# Patient Record
Sex: MALE | Race: WHITE | Employment: FULL TIME | URBAN - METROPOLITAN AREA
[De-identification: names, ages, dates, MRNs, and addresses within clinical notes are randomized per-mention and may not be internally consistent; named-entity substitution may affect disease eponyms.]

---

## 2022-08-02 ENCOUNTER — APPOINTMENT (OUTPATIENT)
Dept: CT IMAGING | Age: 52
End: 2022-08-02
Attending: EMERGENCY MEDICINE
Payer: COMMERCIAL

## 2022-08-02 ENCOUNTER — HOSPITAL ENCOUNTER (OUTPATIENT)
Age: 52
Setting detail: OBSERVATION
Discharge: HOME | End: 2022-08-04
Attending: EMERGENCY MEDICINE | Admitting: FAMILY MEDICINE
Payer: COMMERCIAL

## 2022-08-02 DIAGNOSIS — I16.1 HYPERTENSIVE EMERGENCY: Primary | ICD-10-CM

## 2022-08-02 PROBLEM — R53.1 ACUTE RIGHT-SIDED WEAKNESS: Status: ACTIVE | Noted: 2022-08-02

## 2022-08-02 PROBLEM — R20.0 RIGHT SIDED NUMBNESS: Status: ACTIVE | Noted: 2022-08-02

## 2022-08-02 PROBLEM — G45.9 TIA (TRANSIENT ISCHEMIC ATTACK): Status: ACTIVE | Noted: 2022-08-02

## 2022-08-02 PROBLEM — I16.0 HYPERTENSIVE URGENCY: Status: ACTIVE | Noted: 2022-08-02

## 2022-08-02 LAB
ALBUMIN SERPL-MCNC: 3.7 G/DL (ref 3.5–5)
ALBUMIN/GLOB SERPL: 0.9 {RATIO} (ref 1.1–2.2)
ALP SERPL-CCNC: 65 U/L (ref 45–117)
ALT SERPL-CCNC: 30 U/L (ref 12–78)
ANION GAP SERPL CALC-SCNC: 7 MMOL/L (ref 5–15)
AST SERPL-CCNC: 15 U/L (ref 15–37)
ATRIAL RATE: 71 BPM
BASOPHILS # BLD: 0 K/UL (ref 0–0.1)
BASOPHILS NFR BLD: 0 % (ref 0–1)
BILIRUB SERPL-MCNC: 0.6 MG/DL (ref 0.2–1)
BUN SERPL-MCNC: 15 MG/DL (ref 6–20)
BUN/CREAT SERPL: 15 (ref 12–20)
CALCIUM SERPL-MCNC: 9.2 MG/DL (ref 8.5–10.1)
CALCULATED P AXIS, ECG09: 53 DEGREES
CALCULATED R AXIS, ECG10: 33 DEGREES
CALCULATED T AXIS, ECG11: 22 DEGREES
CHLORIDE SERPL-SCNC: 106 MMOL/L (ref 97–108)
CO2 SERPL-SCNC: 26 MMOL/L (ref 21–32)
CREAT SERPL-MCNC: 0.98 MG/DL (ref 0.7–1.3)
DIAGNOSIS, 93000: NORMAL
DIFFERENTIAL METHOD BLD: NORMAL
EOSINOPHIL # BLD: 0.2 K/UL (ref 0–0.4)
EOSINOPHIL NFR BLD: 2 % (ref 0–7)
ERYTHROCYTE [DISTWIDTH] IN BLOOD BY AUTOMATED COUNT: 12.9 % (ref 11.5–14.5)
GLOBULIN SER CALC-MCNC: 4 G/DL (ref 2–4)
GLUCOSE SERPL-MCNC: 94 MG/DL (ref 65–100)
HCT VFR BLD AUTO: 41.6 % (ref 36.6–50.3)
HGB BLD-MCNC: 14.4 G/DL (ref 12.1–17)
IMM GRANULOCYTES # BLD AUTO: 0 K/UL (ref 0–0.04)
IMM GRANULOCYTES NFR BLD AUTO: 0 % (ref 0–0.5)
INR PPP: 1 (ref 0.9–1.1)
LYMPHOCYTES # BLD: 1.9 K/UL (ref 0.8–3.5)
LYMPHOCYTES NFR BLD: 23 % (ref 12–49)
MCH RBC QN AUTO: 30.5 PG (ref 26–34)
MCHC RBC AUTO-ENTMCNC: 34.6 G/DL (ref 30–36.5)
MCV RBC AUTO: 88.1 FL (ref 80–99)
MONOCYTES # BLD: 0.6 K/UL (ref 0–1)
MONOCYTES NFR BLD: 7 % (ref 5–13)
NEUTS SEG # BLD: 5.8 K/UL (ref 1.8–8)
NEUTS SEG NFR BLD: 68 % (ref 32–75)
NRBC # BLD: 0 K/UL (ref 0–0.01)
NRBC BLD-RTO: 0 PER 100 WBC
P-R INTERVAL, ECG05: 148 MS
PLATELET # BLD AUTO: 196 K/UL (ref 150–400)
PMV BLD AUTO: 10.5 FL (ref 8.9–12.9)
POTASSIUM SERPL-SCNC: 3.5 MMOL/L (ref 3.5–5.1)
PROT SERPL-MCNC: 7.7 G/DL (ref 6.4–8.2)
PROTHROMBIN TIME: 10.6 SEC (ref 9–11.1)
Q-T INTERVAL, ECG07: 408 MS
QRS DURATION, ECG06: 90 MS
QTC CALCULATION (BEZET), ECG08: 443 MS
RBC # BLD AUTO: 4.72 M/UL (ref 4.1–5.7)
SODIUM SERPL-SCNC: 139 MMOL/L (ref 136–145)
VENTRICULAR RATE, ECG03: 71 BPM
WBC # BLD AUTO: 8.5 K/UL (ref 4.1–11.1)

## 2022-08-02 PROCEDURE — 70496 CT ANGIOGRAPHY HEAD: CPT

## 2022-08-02 PROCEDURE — 85025 COMPLETE CBC W/AUTO DIFF WBC: CPT

## 2022-08-02 PROCEDURE — 74011000636 HC RX REV CODE- 636: Performed by: RADIOLOGY

## 2022-08-02 PROCEDURE — 99285 EMERGENCY DEPT VISIT HI MDM: CPT

## 2022-08-02 PROCEDURE — 85610 PROTHROMBIN TIME: CPT

## 2022-08-02 PROCEDURE — 94762 N-INVAS EAR/PLS OXIMTRY CONT: CPT

## 2022-08-02 PROCEDURE — 0042T CT CODE NEURO PERF W CBF: CPT

## 2022-08-02 PROCEDURE — 74011250637 HC RX REV CODE- 250/637: Performed by: EMERGENCY MEDICINE

## 2022-08-02 PROCEDURE — APPNB30 APP NON BILLABLE TIME 0-30 MINS: Performed by: NURSE PRACTITIONER

## 2022-08-02 PROCEDURE — 70450 CT HEAD/BRAIN W/O DYE: CPT

## 2022-08-02 PROCEDURE — 96374 THER/PROPH/DIAG INJ IV PUSH: CPT

## 2022-08-02 PROCEDURE — 36415 COLL VENOUS BLD VENIPUNCTURE: CPT

## 2022-08-02 PROCEDURE — 80053 COMPREHEN METABOLIC PANEL: CPT

## 2022-08-02 PROCEDURE — 74011000250 HC RX REV CODE- 250: Performed by: EMERGENCY MEDICINE

## 2022-08-02 PROCEDURE — G0378 HOSPITAL OBSERVATION PER HR: HCPCS

## 2022-08-02 PROCEDURE — 93005 ELECTROCARDIOGRAM TRACING: CPT

## 2022-08-02 RX ORDER — ACETAMINOPHEN 650 MG/1
650 SUPPOSITORY RECTAL
Status: DISCONTINUED | OUTPATIENT
Start: 2022-08-02 | End: 2022-08-04 | Stop reason: HOSPADM

## 2022-08-02 RX ORDER — LABETALOL HYDROCHLORIDE 5 MG/ML
5 INJECTION, SOLUTION INTRAVENOUS
Status: DISCONTINUED | OUTPATIENT
Start: 2022-08-02 | End: 2022-08-04 | Stop reason: HOSPADM

## 2022-08-02 RX ORDER — ACETAMINOPHEN 325 MG/1
650 TABLET ORAL
Status: DISCONTINUED | OUTPATIENT
Start: 2022-08-02 | End: 2022-08-04 | Stop reason: HOSPADM

## 2022-08-02 RX ORDER — ASPIRIN 325 MG
325 TABLET ORAL ONCE
Status: COMPLETED | OUTPATIENT
Start: 2022-08-02 | End: 2022-08-02

## 2022-08-02 RX ORDER — CLOPIDOGREL BISULFATE 75 MG/1
300 TABLET ORAL ONCE
Status: COMPLETED | OUTPATIENT
Start: 2022-08-02 | End: 2022-08-03

## 2022-08-02 RX ORDER — LABETALOL HYDROCHLORIDE 5 MG/ML
10 INJECTION, SOLUTION INTRAVENOUS ONCE
Status: COMPLETED | OUTPATIENT
Start: 2022-08-02 | End: 2022-08-02

## 2022-08-02 RX ORDER — ASPIRIN 325 MG
325 TABLET ORAL DAILY
Status: DISCONTINUED | OUTPATIENT
Start: 2022-08-03 | End: 2022-08-02 | Stop reason: SDUPTHER

## 2022-08-02 RX ADMIN — IOPAMIDOL 50 ML: 755 INJECTION, SOLUTION INTRAVENOUS at 15:21

## 2022-08-02 RX ADMIN — IOPAMIDOL 100 ML: 755 INJECTION, SOLUTION INTRAVENOUS at 15:22

## 2022-08-02 RX ADMIN — ASPIRIN 325 MG ORAL TABLET 325 MG: 325 PILL ORAL at 20:51

## 2022-08-02 RX ADMIN — LABETALOL HYDROCHLORIDE 10 MG: 5 INJECTION INTRAVENOUS at 15:38

## 2022-08-02 NOTE — ED TRIAGE NOTES
Pt woke this am at [phone] with right arm numbness and felt like he was leaning to the right, denies headache, denies dizziness, denies fever, denies vision of speech difficulty, no blood thinners, no head trauma, lkw 0230, denies leg or facial numbness

## 2022-08-02 NOTE — PROGRESS NOTES
Neurocritical Care Code Stroke Documentation      Symptoms:   Leaning to the right, difficulty controlling right arm   Patient was noted to be hypertensive with /110   Last Known Well: 2:30 am this morning    Medical hx: Hypertension    Anticoagulation: None    VAN:   Negative   NIHSS:   1a-LOC:0    1b-Month/Age:0    1c-Open/Close Hand:0    2-Best Gaze:0    3-Visual Fields:0    4-Facial Palsy:0    5a-Left Arm:0    5b-Right Arm:0    6a-Left Le    6b-Right Le    7-Limb Ataxia:0    8-Sensory:0    9-Best Language:0    10-Dysarthria:0    11-Extinction/Inattention:0  TOTAL SCORE:0   Imaging:   CT: No acute abnormality. CTA: pending (final results below)    CTP: pending (final results below)   Plan:   TNK Candidate: NO    Mechanical thrombectomy Candidate: NO     Discussed with: Dr. Kris Palencia. Arrival time: 1509  Time spent: 25 minutes. Elba Ruth NP  Neurocritical Care Nurse Practitioner    Addendum 4924: CTA HEAD  Left vertebral artery is dominant. There is mild stenosis of the left V4  segment. There is severe multifocal stenosis of the right V4 segment. Fetal  origin of the left posterior cerebral artery. Mild stenosis of the proximal  right P2 segment. There is moderate stenosis of the left P2 segment. . Moderate  to severe stenosis of the anterior right M2 segment. . . There is no aneurysm. .     CT PERFUSION:     There are no regional areas of elevated Tmax, decreased cerebral blood flow or  blood volume. rCBF < 30% = 0 cc. Tmax > 6 seconds = 0 cc. IMPRESSION     1. Intracranial atherosclerotic disease with multifocal stenoses as described. 2.  No large vessel occlusion is identified. Discussed wih Dr. Mary Topete of CTA results. Would recommend medical management with DAPT. Would recommend loading with aspirin and Plavix ( Discussed with ED Physician Dr. Kris Palencia). Recommend checking aspirin and P2Y12 test in AM after loading dose. Neurology consult for stroke evaluation.

## 2022-08-02 NOTE — ED PROVIDER NOTES
48-year-old male with a history of hypertension presents with a chief complaint of feeling off balance and right arm heaviness. Patient reports her symptoms started around 2:30 AM.  He is not currently on any blood pressure medications. He denies similar symptoms in the past.  He denies chest pain, shortness of breath, abdominal pain, GI or urinary symptoms. No past medical history on file. No past surgical history on file. No family history on file. Social History     Socioeconomic History    Marital status: Not on file     Spouse name: Not on file    Number of children: Not on file    Years of education: Not on file    Highest education level: Not on file   Occupational History    Not on file   Tobacco Use    Smoking status: Not on file    Smokeless tobacco: Not on file   Substance and Sexual Activity    Alcohol use: Not on file    Drug use: Not on file    Sexual activity: Not on file   Other Topics Concern    Not on file   Social History Narrative    Not on file     Social Determinants of Health     Financial Resource Strain: Not on file   Food Insecurity: Not on file   Transportation Needs: Not on file   Physical Activity: Not on file   Stress: Not on file   Social Connections: Not on file   Intimate Partner Violence: Not on file   Housing Stability: Not on file         ALLERGIES: Patient has no allergy information on record. Review of Systems   Constitutional:  Negative for fever. HENT:  Negative for rhinorrhea. Respiratory:  Negative for cough. Cardiovascular:  Negative for chest pain. Gastrointestinal:  Negative for abdominal pain. Genitourinary:  Negative for dysuria. Musculoskeletal:  Negative for back pain. Skin:  Negative for wound. Neurological:  Positive for numbness. Psychiatric/Behavioral:  Negative for confusion. Vitals:    08/02/22 1506   BP: (!) 225/110   Pulse: 73   Resp: 16   SpO2: 98%            Physical Exam  Vitals and nursing note reviewed. Constitutional:       General: He is not in acute distress. Appearance: Normal appearance. He is not ill-appearing, toxic-appearing or diaphoretic. HENT:      Head: Normocephalic. Eyes:      Extraocular Movements: Extraocular movements intact. Cardiovascular:      Rate and Rhythm: Normal rate. Pulses: Normal pulses. Heart sounds: Normal heart sounds. Pulmonary:      Effort: Pulmonary effort is normal. No respiratory distress. Breath sounds: Normal breath sounds. Abdominal:      General: There is no distension. Musculoskeletal:         General: Normal range of motion. Cervical back: Normal range of motion. Skin:     General: Skin is dry. Capillary Refill: Capillary refill takes less than 2 seconds. Neurological:      Mental Status: He is alert and oriented to person, place, and time. Comments: Subtle right upper extremity dysmetria   Psychiatric:         Mood and Affect: Mood normal.        MDM  Number of Diagnoses or Management Options  Hypertensive emergency  Diagnosis management comments:     Patient presents with concern for stroke. Stroke alert activated. Patient taken to CT where imaging is unremarkable. Labs unremarkable. Patient not a tPA candidate. Symptoms rapidly improving. Will admit to hospital medicine for stroke work-up.       Perfect Serve Consult for Admission  4:22 PM    ED Room Number: ER25/25  Patient Name and age:  Kari Pastor 47 y.o.  male  Working Diagnosis: Hypertensive emergency  (primary encounter diagnosis)    COVID-19 Suspicion:  no  Sepsis present:  no  Reassessment needed: no  Code Status:  Full Code  Readmission: no  Isolation Requirements:  no  Recommended Level of Care:  step down  Department:[facility] Adult ED - 21   Other:  no tpa, awaiting neuro recs regarding blood pressure control    Total critical care time spent exclusive of procedures: 37 minutes           Amount and/or Complexity of Data Reviewed  Clinical lab tests: ordered and reviewed  Tests in the radiology section of CPT®: ordered and reviewed      ED Course as of 08/02/22 2209 Tue Aug 02, 2022   1607 EKG shows sinus rhythm at a rate of 71, normal intervals, normal axis, no ischemic changes.  [RD]      ED Course User Index  [RD] Tracey Bacon MD       Procedures

## 2022-08-03 ENCOUNTER — APPOINTMENT (OUTPATIENT)
Dept: MRI IMAGING | Age: 52
End: 2022-08-03
Attending: PSYCHIATRY & NEUROLOGY
Payer: COMMERCIAL

## 2022-08-03 ENCOUNTER — APPOINTMENT (OUTPATIENT)
Dept: NON INVASIVE DIAGNOSTICS | Age: 52
End: 2022-08-03
Attending: FAMILY MEDICINE
Payer: COMMERCIAL

## 2022-08-03 LAB
ASPIRIN TEST, ASPIRN: 377 ARU
CHOLEST SERPL-MCNC: 189 MG/DL
ECHO AV PEAK GRADIENT: 6 MMHG
ECHO AV PEAK VELOCITY: 1.2 M/S
ECHO AV VELOCITY RATIO: 0.75
ECHO LV E' LATERAL VELOCITY: 9 CM/S
ECHO LV E' SEPTAL VELOCITY: 9 CM/S
ECHO LV FRACTIONAL SHORTENING: 53 % (ref 28–44)
ECHO LV INTERNAL DIMENSION DIASTOLE INDEX: 1.99 CM/M2
ECHO LV INTERNAL DIMENSION DIASTOLIC: 5.1 CM (ref 4.2–5.9)
ECHO LV INTERNAL DIMENSION SYSTOLIC INDEX: 0.94 CM/M2
ECHO LV INTERNAL DIMENSION SYSTOLIC: 2.4 CM
ECHO LV IVSD: 0.9 CM (ref 0.6–1)
ECHO LV MASS 2D: 175.6 G (ref 88–224)
ECHO LV MASS INDEX 2D: 68.6 G/M2 (ref 49–115)
ECHO LV POSTERIOR WALL DIASTOLIC: 1 CM (ref 0.6–1)
ECHO LV RELATIVE WALL THICKNESS RATIO: 0.39
ECHO LVOT PEAK GRADIENT: 3 MMHG
ECHO LVOT PEAK VELOCITY: 0.9 M/S
ECHO MV A VELOCITY: 0.63 M/S
ECHO MV E VELOCITY: 0.67 M/S
ECHO MV E/A RATIO: 1.06
ECHO MV E/E' LATERAL: 7.44
ECHO MV E/E' RATIO (AVERAGED): 7.44
ECHO MV E/E' SEPTAL: 7.44
EST. AVERAGE GLUCOSE BLD GHB EST-MCNC: 105 MG/DL
HBA1C MFR BLD: 5.3 % (ref 4–5.6)
HDLC SERPL-MCNC: 77 MG/DL
HDLC SERPL: 2.5 {RATIO} (ref 0–5)
LDLC SERPL CALC-MCNC: 97.4 MG/DL (ref 0–100)
P2Y12 PLT RESPONSE,PPPR: 190 PRU (ref 194–418)
TRIGL SERPL-MCNC: 73 MG/DL (ref ?–150)
VLDLC SERPL CALC-MCNC: 14.6 MG/DL

## 2022-08-03 PROCEDURE — 99204 OFFICE O/P NEW MOD 45 MIN: CPT | Performed by: PSYCHIATRY & NEUROLOGY

## 2022-08-03 PROCEDURE — 77030040361 HC SLV COMPR DVT MDII -B

## 2022-08-03 PROCEDURE — 85576 BLOOD PLATELET AGGREGATION: CPT

## 2022-08-03 PROCEDURE — 92610 EVALUATE SWALLOWING FUNCTION: CPT

## 2022-08-03 PROCEDURE — 80061 LIPID PANEL: CPT

## 2022-08-03 PROCEDURE — 74011250637 HC RX REV CODE- 250/637: Performed by: EMERGENCY MEDICINE

## 2022-08-03 PROCEDURE — 97165 OT EVAL LOW COMPLEX 30 MIN: CPT

## 2022-08-03 PROCEDURE — G0378 HOSPITAL OBSERVATION PER HR: HCPCS

## 2022-08-03 PROCEDURE — 93306 TTE W/DOPPLER COMPLETE: CPT

## 2022-08-03 PROCEDURE — 36415 COLL VENOUS BLD VENIPUNCTURE: CPT

## 2022-08-03 PROCEDURE — 74011250637 HC RX REV CODE- 250/637: Performed by: PSYCHIATRY & NEUROLOGY

## 2022-08-03 PROCEDURE — 97161 PT EVAL LOW COMPLEX 20 MIN: CPT

## 2022-08-03 PROCEDURE — 70551 MRI BRAIN STEM W/O DYE: CPT

## 2022-08-03 PROCEDURE — 74011250637 HC RX REV CODE- 250/637: Performed by: INTERNAL MEDICINE

## 2022-08-03 PROCEDURE — 83036 HEMOGLOBIN GLYCOSYLATED A1C: CPT

## 2022-08-03 PROCEDURE — 97530 THERAPEUTIC ACTIVITIES: CPT

## 2022-08-03 RX ORDER — ASPIRIN 81 MG/1
81 TABLET ORAL DAILY
Status: DISCONTINUED | OUTPATIENT
Start: 2022-08-03 | End: 2022-08-04 | Stop reason: HOSPADM

## 2022-08-03 RX ORDER — AMLODIPINE BESYLATE 5 MG/1
2.5 TABLET ORAL DAILY
Status: DISCONTINUED | OUTPATIENT
Start: 2022-08-03 | End: 2022-08-04

## 2022-08-03 RX ORDER — ATORVASTATIN CALCIUM 40 MG/1
40 TABLET, FILM COATED ORAL
Status: DISCONTINUED | OUTPATIENT
Start: 2022-08-03 | End: 2022-08-04 | Stop reason: HOSPADM

## 2022-08-03 RX ORDER — CLOPIDOGREL BISULFATE 75 MG/1
75 TABLET ORAL DAILY
Status: DISCONTINUED | OUTPATIENT
Start: 2022-08-03 | End: 2022-08-04 | Stop reason: HOSPADM

## 2022-08-03 RX ADMIN — ASPIRIN 81 MG: 81 TABLET, COATED ORAL at 10:10

## 2022-08-03 RX ADMIN — AMLODIPINE BESYLATE 2.5 MG: 5 TABLET ORAL at 16:18

## 2022-08-03 RX ADMIN — CLOPIDOGREL BISULFATE 300 MG: 75 TABLET ORAL at 01:09

## 2022-08-03 RX ADMIN — ATORVASTATIN CALCIUM 40 MG: 40 TABLET, FILM COATED ORAL at 23:12

## 2022-08-03 RX ADMIN — CLOPIDOGREL BISULFATE 75 MG: 75 TABLET ORAL at 10:10

## 2022-08-03 NOTE — PROGRESS NOTES
Orders received, chart reviewed and patient evaluated by physical therapy. Recommend:  No skilled physical therapy/ follow up rehabilitation needs identified at this time. Of note, BP elevated in standing to 217/109mmHg - no c/o HA. Full evaluation to follow.      Matthew Bartlett, PT, DPT

## 2022-08-03 NOTE — PROGRESS NOTES
Music Therapy Assessment  [address]    John Herrera [id number]     1970  47 y.o.  male    Patient Telephone Number: [phone] (home)   Restorationist Affiliation: Spiritism   Language: English   Patient Active Problem List    Diagnosis Date Noted    TIA (transient ischemic attack) 08/02/2022    Acute right-sided weakness 08/02/2022    Hypertensive urgency 08/02/2022    Right sided numbness 08/02/2022        Date: 8/3/2022            Total Time (in minutes): 30          [facility] 6S NEURO-SCI TELE    Mental Status:   [x] Alert [  ] Zehra Damme [  ]  Confused  [  ] Minimally responsive  [  ] Sleeping    Communication Status: [  ] Impaired Speech [  ] Nonverbal -N/A    Physical Status:   [  ] Oxygen in use  [  ] Hard of Hearing [  ] Vision Impaired  [  ] Ambulatory  [  ] Ambulatory with assistance [  ] Non-ambulatory -N/A    Music Preferences, Background: Pt named Carolyn Perez as a favorite musician. Pt's spouse pointed out that the pt also enjoys songs from [employer]. Pt's tone of voice sounded surprised by this at first, and then pt agreed, saying he likes songs that tell a story. Clinical Problem addressed: Support relaxation and stress reduction. Goal(s) met in session:  Physical/Pain management (Scale of 1-10):    Pre-session rating: Pt denied pain. Post-session rating: Pt didn't report on pain.   [x] Increased relaxation   [x] Affected breathing patterns  [x] Decreased muscle tension   [  ] Decreased agitation  [  ] Affected heart rate    [  ] Increased alertness     Emotional/Psychological:  [x] Increased self-expression   [  ] Decreased aggressive behavior   [x] Decreased feelings of stress  [x] Discussed healthy coping skills     [  ] Improved mood    [  ] Decreased withdrawn behavior     Social:  [  ] Decreased feelings of isolation/loneliness [  ] Positive social interaction   [x] Provided support and/or comfort for family/friends    Spiritual:  [  ] Spiritual support    [  ] Expressed peace  [  ] Expressed sergio    [  ] Discussed beliefs    Techniques Utilized (Check all that apply):   [  ] Procedural support MT [x] Music for relaxation [  ] Patient preferred music  [  ] Ольга analysis  [  ] Song choice  [  ] Music for validation  [  ] Entrainment  [  ] Movement to music [x] Guided visualization  [  ] Tiffani Fees  [  ] Patient instrument playing [  ] Farheen Segoviaot writing  [  ] Pedro Place along   [  ] Kreg Spire  [  ] Sensory stimulation  [x] Active Listening  [  ] Music for spiritual support [  ] Making of CDs as gifts    Session Observations:  Referral from Father Susana Gibbs. Patient (pt) was alert lying in bed and his spouse Gregor Amaya was at bedside. This music therapist (MT) asked the pt how he was feeling, about his music preferences and then briefly explained role. Pt's spouse had been wanting to run an errand and exited to do so, knowing the pt would be in a good place of receiving music therapy. MT sat at bedside and began playing the acoustic guitar. MT played a chord progression softly and at a steady, moderately slow tempo. MT provided a music-accompanied meditation, starting by talking the pt through mindfully relaxing different muscle groups with the use of intentional slow, deep breaths, visualizing his breath as a cleansing and strengthening force. MT then invited the pt to imagine being in a safe, comfortable, peaceful place where all is well. MT guided the pt in thinking through what his five senses would be experiencing in this place to help him feel more present in that calming environment. All the while MT provided the guitar accompaniment to create a space for pt to focus his mind on the meditation. MT slowly and gradually brought the pt back into the room as the music experience concluded.  MT observed the pt to actively participate and increase relaxation, as evidenced by (AEB) his eyes remaining closed, his breathing slowing and deepening, making adjustments to his body's position in bed to relax his muscles. Pt said he wished his blood pressure was being monitored during this because he felt certain his blood pressure would have dropped. He reported feeling more calm and relaxed. He asked MT how he could replicate a similar experience at home. MT made recommendations of practical ways pt could do this. Pt's spouse re-entered as the MT and pt were saying good bye. The pt expressed gratitude for the session, as did his spouse.     NENA EstradaBC (Music Therapist-Board Certified)  Spiritual Care Department  Referral-based service

## 2022-08-03 NOTE — PROGRESS NOTES
SPEECH PATHOLOGY BEDSIDE SWALLOW EVALUATION/DISCHARGE  Patient: Sandie Rivers (54 y.o. male)  Date: 8/3/2022  Primary Diagnosis: Hypertensive urgency [I16.0]  TIA (transient ischemic attack) [G45.9]  Acute right-sided weakness [R53.1]  Right sided numbness [R20.0]       Precautions:        ASSESSMENT :  Based on the objective data described below, the patient presents with functional oropharyngeal swallow with no difficulties or s/s of aspiration appreciated at bedside with any consistencies. Patient denies any speech or swallowing concerns. Given this and CT negative for acute infarct, recommend regular diet/thin liquids with general aspiration precautions. Suspect pt is at baseline swallow function and therefore, skilled acute therapy provided by a speech-language pathologist is not indicated at this time. SLP will sign off. Please reconsult with any changes or if SLP can be of any further assistance. PLAN :  Recommendations:  -- regular diet/ thin liquids  -- general aspiration precautions including completely upright for all PO   -- SLP will sign off     Discharge Recommendations: None     SUBJECTIVE:   Patient stated I was even talking on work meetings yesterday while it was happening and my speech was fine. OBJECTIVE:   No past medical history on file. No past surgical history on file. Prior Level of Function/Home Situation:      Diet prior to admission: regular/thin  Current Diet:  regular/thin   Cognitive and Communication Status:  Neurologic State: Alert  Orientation Level: Oriented X4  Cognition: Follows commands  Perception: Appears intact  Perseveration: No perseveration noted  Safety/Judgement: Awareness of environment  Oral Assessment:  Oral Assessment  Labial: No impairment  Dentition: Intact; Natural  Oral Hygiene: moist oral mucoosa free of secretions  Lingual: No impairment  Velum: No impairment  Mandible: No impairment  P.O.  Trials:  Patient Position: upright in bed  Vocal quality prior to P.O.: No impairment  Consistency Presented: Thin liquid  How Presented: Self-fed/presented     Bolus Acceptance: No impairment  Bolus Formation/Control: No impairment     Propulsion: No impairment  Oral Residue: None  Initiation of Swallow: No impairment  Laryngeal Elevation: Functional  Aspiration Signs/Symptoms: None  Pharyngeal Phase Characteristics: No impairment, issues, or problems              Oral Phase Severity: No impairment  Pharyngeal Phase Severity : No impairment  NOMS:   The NOMS functional outcome measure was used to quantify this patient's level of swallowing impairment. Based on the NOMS, the patient was determined to be at level 7 for swallow function     NOMS Swallowing Levels:  Level 1 (CN): NPO  Level 2 (CM): NPO but takes consistency in therapy  Level 3 (CL): Takes less than 50% of nutrition p.o. and continues with nonoral feedings; and/or safe with mod cues; and/or max diet restriction  Level 4 (CK): Safe swallow but needs mod cues; and/or mod diet restriction; and/or still requires some nonoral feeding/supplements  Level 5 (CJ): Safe swallow with min diet restriction; and/or needs min cues  Level 6 (CI): Independent with p.o.; rare cues; usually self cues; may need to avoid some foods or needs extra time  Level 7 ([address] [place] [address]): Independent for all p.o.  GREGORIO. (2003). National Outcomes Measurement System (NOMS): Adult Speech-Language Pathology User's Guide. Pain:  Pain Scale 1: Numeric (0 - 10)  Pain Intensity 1: 0     After treatment:   Patient left in no apparent distress in bed, Call bell within reach, and Nursing notified    COMMUNICATION/EDUCATION:     The patient's plan of care including recommendations, planned interventions, and recommended diet changes were discussed with: Registered nurse.      Thank you for this referral.  Arnold Magallon M.S. [address] Pathologist     Time Calculation: 8 mins

## 2022-08-03 NOTE — PROGRESS NOTES
Bedside and Verbal shift change report given to MAGGIE García (oncoming nurse) by Ruiz Jones (offgoing nurse). Report included the following information SBAR, Kardex, Intake/Output, MAR, Recent Results, Cardiac Rhythm NSR, and Dual Neuro Assessment.

## 2022-08-03 NOTE — ED NOTES
TRANSFER - OUT REPORT:    Verbal report given to [address], RN(name) on Monae Fernandes  being transferred to NSTU(unit) for routine progression of care       Report consisted of patients Situation, Background, Assessment and   Recommendations(SBAR). Information from the following report(s) SBAR was reviewed with the receiving nurse. Lines:   Peripheral IV 08/02/22 Right Antecubital (Active)   Site Assessment Clean, dry, & intact 08/02/22 1519   Phlebitis Assessment 0 08/02/22 1519   Infiltration Assessment 0 08/02/22 1519   Dressing Status Clean, dry, & intact 08/02/22 1519   Hub Color/Line Status Flushed 08/02/22 1519        Opportunity for questions and clarification was provided.       Patient transported with:   [employer]

## 2022-08-03 NOTE — PROGRESS NOTES
Transition of Care Plan  RUR- Observation  DISPOSITION: Home with spouse  F/U with PCP/Specialist    Transport: Spouse    Reason for Admission:  stroke work up                     RUR Score:          observation            Plan for utilizing home health:  no hx of hh, no home health needs - therapy has cleared patient         PCP: First and Last name:  Zain Flood     Name of Practice:    Are you a current patient: Yes/No: Yes   Approximate date of last visit: Patient has his first appointment with Eden Tineo scheduled for 8/4/22   Can you participate in a virtual visit with your PCP:                     Current Advanced Directive/Advance Care Plan: Full Code      Healthcare Decision Maker: [address] is spouse  Click here to complete [address] including selection of the Healthcare Decision Maker Relationship (ie \"Primary\")                             Transition of Care Plan:                      CM met with patient and spouse at bedside to introduce self and role. Living situation: lives with spouse in 2 story home, 4 steps to enter, works full time for [employer]  ADLs: independent  DME: none  Previous IPR/SNF: none  Previous home health: none  Demographics: confirmed, [address] insured  Ph[facility] [place] [address]  Main point of contact: [facility] [phone]    CM to follow patient progress and assist as recommended with NAOMIE plan. Care Management Interventions  PCP Verified by CM: Yes  Palliative Care Criteria Met (RRAT>21 & CHF Dx)?: No  Mode of Transport at Discharge:  Other (see comment) (spouse)  Transition of Care Consult (CM Consult): Discharge Planning  MyChart Signup: No  Discharge Durable Medical Equipment: No  Health Maintenance Reviewed: Yes  Physical Therapy Consult: Yes  Occupational Therapy Consult: Yes  Speech Therapy Consult: Yes  Support Systems: Spouse/Significant Other  Confirm Follow Up Transport: Family  The Plan for Transition of Care is Related to the Following Treatment Goals : home  The Patient and/or Patient Representative was Provided with a Choice of Provider and Agrees with the Discharge Plan?: No  Name of the Patient Representative Who was Provided with a Choice of Provider and Agrees with the Discharge Plan: patient, spouse  Freedom of Choice List was Provided with Basic Dialogue that Supports the Patient's Individualized Plan of Care/Goals, Treatment Preferences and Shares the Quality Data Associated with the Providers?: No  [place] Resource Information Provided?: No  Discharge Location  Patient Expects to be Discharged to[de-identified] Home    The program assesses the family and/or caregiver's readiness, willingness, and ability to provide or support and self-management activities for the patient as needed. Florecita Rodriguez M.S.JUAN ALBERTO.

## 2022-08-03 NOTE — PROGRESS NOTES
PHYSICAL THERAPY EVALUATION WITH DISCHARGE  Patient: Joann Esrtada (2 y.o. male)  Date: 8/3/2022  Primary Diagnosis: Hypertensive urgency [I16.0]  TIA (transient ischemic attack) [G45.9]  Acute right-sided weakness [R53.1]  Right sided numbness [R20.0]       Precautions:          ASSESSMENT  Based on the objective data described below, the patient presents at/near his baseline functional mobility level following admission for HTN emergency and c/o R UE weakness and impaired coordination (now resolved). CT negative, MRI pending at time of eval. At baseline, he lived at home with family and was fully indep and active. Neuro exam today appears non focal. Strength, coordination, sensation, vision, and speech in tact. He was able to complete bed mobility, transfers, and take a few steps to chair an an overall Indep/SUP level. Further mobility progression deferred 2* consistently elevating BP with positional changes, up to 217/109mmHg in stance, however pt reports that he has been ambulating to bathroom with steady gait and no mobility concerns. Remained up in chair at end of session, NAD. Provided education on BE FAST and answered questions to satisfaction. No further acute care PT indicated at this time, will complete orders and sign off. Recommend home with family once medically cleared. Functional Outcome Measure: The patient scored Total: 56/56 on the Tan Balance Assessment which is indicative of low fall risk. Other factors to consider for discharge: none     Further skilled acute physical therapy is not indicated at this time. PLAN :  Recommendation for discharge: (in order for the patient to meet his/her long term goals)  No skilled physical therapy/ follow up rehabilitation needs identified at this time.     This discharge recommendation:  Has been made in collaboration with the attending provider and/or case management    IF patient discharges home will need the following DME: none SUBJECTIVE:   Patient stated I'm back to normal now.     OBJECTIVE DATA SUMMARY:   HISTORY:    No past medical history on file. No past surgical history on file. Prior level of function: Indep; works full time; no AD; good family support   Personal factors and/or comorbidities impacting plan of care: PMHx    Home Situation  Home Environment: Private residence  # Steps to Enter: 4  Rails to Enter: Yes  One/Two Story Residence: Two story  # of Interior Steps: 12  Living Alone: No  Support Systems: Spouse/Significant Other  Patient Expects to be Discharged to[de-identified] Home  Current DME Used/Available at Home: None  Tub or Shower Type: Shower    EXAMINATION/PRESENTATION/DECISION MAKING:   Critical Behavior:  Neurologic State: Alert  Orientation Level: Oriented X4  Cognition: Appropriate decision making, Appropriate for age attention/concentration, Appropriate safety awareness, Follows commands  Safety/Judgement: Awareness of environment, Fall prevention, Good awareness of safety precautions, Home safety  Hearing:     Skin:    Edema:   Range Of Motion:  AROM: Within functional limits  PROM: Within functional limits    Strength:    Strength: Within functional limits       Tone & Sensation:   Tone: Normal  Sensation: Intact        Coordination:  Coordination: Within functional limits  Vision:   Tracking: Able to track stimulus in all quadrants w/o difficulty  Diplopia: No  Acuity: Within Defined Limits  Corrective Lenses: Glasses  Functional Mobility:  Bed Mobility:     Supine to Sit: Independent  Sit to Supine: Independent  Scooting: Independent    Transfers:  Sit to Stand: Independent  Stand to Sit: Independent  Bed to Chair: Independent    Balance:   Sitting: Intact  Standing: Intact; Without support  Ambulation/Gait Training:  Distance (ft): 5 Feet (ft)  Assistive Device: Gait belt  Ambulation - Level of Assistance: Supervision    Functional Measure  Tan Balance Test:    Sitting to Standin  Standing Unsupported: 4  Sitting with Back Unsupported: 4  Standing to Sittin  Transfers: 4  Standing Unsupported with Eyes Closed: 4  Standing Unsupported with Feet Together: 4  Reach Forward with Outstretched Arm: 4   Object: 4  Turn to Look Over Shoulders: 4  Turn 360 Degrees: 4  Alternate Foot on Step/Stool: 4  Standing Unsupported One Foot in Front: 4  Stand on One Le  Total: 56/56         56=Maximum possible score;   0-20=High fall risk  21-40=Moderate fall risk   41-56=Low fall risk        Physical Therapy Evaluation Charge Determination   History Examination Presentation Decision-Making   LOW Complexity : Zero comorbidities / personal factors that will impact the outcome / POC LOW Complexity : 1-2 Standardized tests and measures addressing body structure, function, activity limitation and / or participation in recreation  LOW Complexity : Stable, uncomplicated  LOW Complexity : FOTO score of       Based on the above components, the patient evaluation is determined to be of the following complexity level: LOW       Activity Tolerance:   Good and elevated BP    After treatment patient left in no apparent distress:   Sitting in chair and Call bell within reach    COMMUNICATION/EDUCATION:   The patients plan of care was discussed with: Occupational therapist, Registered nurse, and Case management. Patient was educated regarding His deficit(s) of resolved R UE weakness as this relates to His diagnosis of possible CVA/TIA. He demonstrated Good understanding as evidenced by nodding. Patient and/or family was verbally educated on the BE FAST acronym for signs/symptoms of CVA and TIA. BE FAST was written on patient's communication board  for visual education and reinforcement. All questions answered with patient indicating good understanding. Fall prevention education was provided and the patient/caregiver indicated understanding.     Thank you for this referral.  Sheridan Singh, PT, DPT Time Calculation: 21 mins

## 2022-08-03 NOTE — CONSULTS
INPATIENT NEUROLOGY CONSULTATION  8/3/2022     Consulted by: Shiela Osman DO        Patient ID:  Ward Harper  [id number]  47 y.o.  1970    CC: Difficulty walking straight and arm movements    HPI    Tanya Dupree is a 50-year-old gentleman with poorly controlled hypertension who is here because of new symptoms. He tells me that yesterday morning he woke up later than expected and as soon as he got a bed his right arm did not feel like it was coordinated. He could not control it very well. He also felt that he was leaning to the right. He went about his day. Later in the day he decided to check his blood pressure and it was highly elevated. He was still not back at his baseline. He came to the hospital.  No headache. No double vision. No slurred speech. No left-sided symptoms. Some questionable right leg numbness a couple weeks ago resolved. CTA was done showing intracranial atherosclerosis with some severe stenosis in the right MCA and right vertebral artery. LDL is 97. He was loaded with aspirin and Plavix yesterday. Plavix assay is therapeutic. Review of Systems   Eyes:  Negative for double vision. Neurological:  Negative for speech change. Right arm ataxia, difficulty walking   All other systems reviewed and are negative. No past medical history on file. No family history on file.   Social History     Socioeconomic History    Marital status:      Spouse name: Not on file    Number of children: Not on file    Years of education: Not on file    Highest education level: Not on file   Occupational History    Not on file   Tobacco Use    Smoking status: Not on file    Smokeless tobacco: Not on file   Substance and Sexual Activity    Alcohol use: Not on file    Drug use: Not on file    Sexual activity: Not on file   Other Topics Concern    Not on file   Social History Narrative    Not on file     Social Determinants of Health     Financial Resource Strain: Not on file   Food Insecurity: Not on file   Transportation Needs: Not on file   Physical Activity: Not on file   Stress: Not on file   Social Connections: Not on file   Intimate Partner Violence: Not on file   Housing Stability: Not on file     Current Facility-Administered Medications   Medication Dose Route Frequency    aspirin delayed-release tablet 81 mg  81 mg Oral DAILY    clopidogreL (PLAVIX) tablet 75 mg  75 mg Oral DAILY    atorvastatin (LIPITOR) tablet 40 mg  40 mg Oral QHS    acetaminophen (TYLENOL) tablet 650 mg  650 mg Oral Q4H PRN    Or    acetaminophen (TYLENOL) solution 650 mg  650 mg Per NG tube Q4H PRN    Or    acetaminophen (TYLENOL) suppository 650 mg  650 mg Rectal Q4H PRN    labetaloL (NORMODYNE;TRANDATE) injection 5 mg  5 mg IntraVENous Q10MIN PRN     No Known Allergies    Visit Vitals  /74 (BP 1 Location: Right upper arm, BP Patient Position: At rest)   Pulse (!) 59   Temp 97.7 °F (36.5 °C)   Resp 11   Ht 6' 1\" (1.854 m)   Wt 300 lb (136.1 kg)   SpO2 100%   BMI 39.58 kg/m²     Physical Exam  Vitals and nursing note reviewed. Constitutional:       Appearance: Normal appearance. Cardiovascular:      Rate and Rhythm: Normal rate. Pulmonary:      Effort: Pulmonary effort is normal.   Neurological:      Mental Status: He is alert and oriented to person, place, and time. Coordination: Finger-Nose-Finger Test normal.      Deep Tendon Reflexes: Strength normal.     Neurologic Exam     Mental Status   Oriented to person, place, and time. Cranial Nerves   Cranial nerves II through XII intact. Motor Exam   Muscle bulk: normal    Strength   Strength 5/5 throughout.      Sensory Exam   Light touch normal.     Gait, Coordination, and Reflexes     Gait  Gait: (Deferred)    Coordination   Finger to nose coordination: normal         Lab Results   Component Value Date/Time    WBC 8.5 08/02/2022 03:20 PM    HGB 14.4 08/02/2022 03:20 PM    HCT 41.6 08/02/2022 03:20 PM    PLATELET 545 12/80/1522 03:20 PM MCV 88.1 08/02/2022 03:20 PM     Lab Results   Component Value Date/Time    Hemoglobin A1c 5.3 08/03/2022 04:24 AM    Glucose 94 08/02/2022 03:20 PM    LDL, calculated 97.4 08/03/2022 04:24 AM    Creatinine 0.98 08/02/2022 03:20 PM      Lab Results   Component Value Date/Time    Cholesterol, total 189 08/03/2022 04:24 AM    HDL Cholesterol 77 08/03/2022 04:24 AM    LDL, calculated 97.4 08/03/2022 04:24 AM    Triglyceride 73 08/03/2022 04:24 AM    CHOL/HDL Ratio 2.5 08/03/2022 04:24 AM     Lab Results   Component Value Date/Time    ALT (SGPT) 30 08/02/2022 03:20 PM    Alk. phosphatase 65 08/02/2022 03:20 PM    Bilirubin, total 0.6 08/02/2022 03:20 PM    Albumin 3.7 08/02/2022 03:20 PM    Protein, total 7.7 08/02/2022 03:20 PM    INR 1.0 08/02/2022 03:20 PM    Prothrombin time 10.6 08/02/2022 03:20 PM    PLATELET 875 49/16/1518 03:20 PM        CT Results (maximum last 3): Results from Hospital Encounter encounter on 08/02/22    CT CODE NEURO PERF W CBF    Narrative  EXAM: CTA CODE NEURO HEAD AND NECK W CONT, CT CODE NEURO PERF W CBF    INDICATION: Code Stroke    COMPARISON: None. CONTRAST: 100 mL of Isovue-370. TECHNIQUE:  Unenhanced  images were obtained to localize the volume for  acquisition. Multislice helical axial CT angiography was performed from the  aortic arch to the top of the head during uneventful rapid bolus intravenous  contrast administration. Coronal and sagittal reformations and 3D post  processing was performed. CT dose reduction was achieved through use of a  standardized protocol tailored for this examination and automatic exposure  control for dose modulation. This study was analyzed by the [address] ai algorithm. CT brain perfusion was performed with generation of hemodynamic maps of multiple  parameters, including cerebral blood flow, cerebral blood volume, and MTT (mean  transit time).   CT dose reduction was achieved through use of a standardized  protocol tailored for this examination and automatic exposure control for dose  modulation. FINDINGS:    CTA NECK  There is conventional three vessel arch anatomy. The bilateral subclavian,  common carotid, and internal carotid arteries are patent with no flow-limiting  stenosis. % of right carotid artery stenosis: No significant stenosis  % of left carotid artery stenosis: No significant stenosis    NASCET method was utilized for calculating stenosis. Left vertebral artery is dominant. .  The cervical soft tissues are unremarkable. There are degenerative changes of the cervical spine. CTA HEAD  Left vertebral artery is dominant. There is mild stenosis of the left V4  segment. There is severe multifocal stenosis of the right V4 segment. Fetal  origin of the left posterior cerebral artery. Mild stenosis of the proximal  right P2 segment. There is moderate stenosis of the left P2 segment. . Moderate  to severe stenosis of the anterior right M2 segment. . . There is no aneurysm. .    CT PERFUSION:    There are no regional areas of elevated Tmax, decreased cerebral blood flow or  blood volume. rCBF < 30% = 0 cc. Tmax > 6 seconds = 0 cc. Impression  1. Intracranial atherosclerotic disease with multifocal stenoses as described. 2.  No large vessel occlusion is identified. 3.  Other incidental findings as above. CTA CODE NEURO HEAD AND NECK W CONT    Narrative  EXAM: CTA CODE NEURO HEAD AND NECK W CONT, CT CODE NEURO PERF W CBF    INDICATION: Code Stroke    COMPARISON: None. CONTRAST: 100 mL of Isovue-370. TECHNIQUE:  Unenhanced  images were obtained to localize the volume for  acquisition. Multislice helical axial CT angiography was performed from the  aortic arch to the top of the head during uneventful rapid bolus intravenous  contrast administration. Coronal and sagittal reformations and 3D post  processing was performed.   CT dose reduction was achieved through use of a  standardized protocol tailored for this examination and automatic exposure  control for dose modulation. This study was analyzed by the [address] ai algorithm. CT brain perfusion was performed with generation of hemodynamic maps of multiple  parameters, including cerebral blood flow, cerebral blood volume, and MTT (mean  transit time). CT dose reduction was achieved through use of a standardized  protocol tailored for this examination and automatic exposure control for dose  modulation. FINDINGS:    CTA NECK  There is conventional three vessel arch anatomy. The bilateral subclavian,  common carotid, and internal carotid arteries are patent with no flow-limiting  stenosis. % of right carotid artery stenosis: No significant stenosis  % of left carotid artery stenosis: No significant stenosis    NASCET method was utilized for calculating stenosis. Left vertebral artery is dominant. .  The cervical soft tissues are unremarkable. There are degenerative changes of the cervical spine. CTA HEAD  Left vertebral artery is dominant. There is mild stenosis of the left V4  segment. There is severe multifocal stenosis of the right V4 segment. Fetal  origin of the left posterior cerebral artery. Mild stenosis of the proximal  right P2 segment. There is moderate stenosis of the left P2 segment. . Moderate  to severe stenosis of the anterior right M2 segment. . . There is no aneurysm. .    CT PERFUSION:    There are no regional areas of elevated Tmax, decreased cerebral blood flow or  blood volume. rCBF < 30% = 0 cc. Tmax > 6 seconds = 0 cc. Impression  1. Intracranial atherosclerotic disease with multifocal stenoses as described. 2.  No large vessel occlusion is identified. 3.  Other incidental findings as above. MRI Results (maximum last 3): No results found for this or any previous visit. VAS/US/Carotid Doppler Results (maximum last 3): No results found for this or any previous visit. PET Results (maximum last 3):    No results found for this or any previous visit. Assessment and Plan        50-year-old gentleman with stroke risk factors whom I suspect was suffering an acute TIA possibly an acute stroke given duration of symptoms. MRI brain has been ordered with attention to the posterior territory. He needs to stay on dual antiplatelets and I have initiated low-dose aspirin and Plavix today. We will check aspirin assay hopefully later today. Echo needs to be done. Stay on telemetry. Stroke education done. I talked him at the bedside about his imaging results and plans. Any acute change, activate code stroke immediately. 45 minutes of time was taken in total reviewing the medical record today to include personal review of the neuroimaging by me, then time at the bedside with the patient with examination and discussion, and then time completing documentation today. During this evaluation, we also discussed stroke education to include signs and symptoms of stroke and TIA. This clinical note was dictated with an electronic dictation software that can make unintentional errors. If there are any questions, please contact me directly for clarification.       2 [facility],   NEUROLOGIST  Diplomate WILL  8/3/2022

## 2022-08-03 NOTE — PROGRESS NOTES
Problem: Patient Education: Go to Patient Education Activity  Goal: Patient/Family Education  Outcome: Progressing Towards Goal     Problem: TIA/CVA Stroke: Day 2 Until Discharge  Goal: Activity/Safety  Outcome: Progressing Towards Goal  Goal: Diagnostic Test/Procedures  Outcome: Progressing Towards Goal  Goal: Nutrition/Diet  Outcome: Progressing Towards Goal  Goal: Discharge Planning  Outcome: Progressing Towards Goal  Goal: Medications  Outcome: Progressing Towards Goal  Goal: Respiratory  Outcome: Progressing Towards Goal  Goal: Treatments/Interventions/Procedures  Outcome: Progressing Towards Goal  Goal: Psychosocial  Outcome: Progressing Towards Goal  Goal: *Verbalizes anxiety and depression are reduced or absent  Outcome: Progressing Towards Goal  Goal: *Absence of aspiration  Outcome: Progressing Towards Goal  Goal: *Absence of deep venous thrombosis signs and symptoms(Stroke Metric)  Outcome: Progressing Towards Goal  Goal: *Optimal pain control at patient's stated goal  Outcome: Progressing Towards Goal  Goal: *Tolerating diet  Outcome: Progressing Towards Goal  Goal: *Ability to perform ADLs and demonstrates progressive mobility and function  Outcome: Progressing Towards Goal  Goal: *Stroke education continued(Stroke Metric)  Outcome: Progressing Towards Goal     Problem: Ischemic Stroke: Discharge Outcomes  Goal: *Verbalizes anxiety and depression are reduced or absent  Outcome: Progressing Towards Goal  Goal: *Verbalize understanding of risk factor modification(Stroke Metric)  Outcome: Progressing Towards Goal  Goal: *Hemodynamically stable  Outcome: Progressing Towards Goal  Goal: *Absence of aspiration pneumonia  Outcome: Progressing Towards Goal  Goal: *Aware of needed dietary changes  Outcome: Progressing Towards Goal  Goal: *Verbalize understanding of prescribed medications including anti-coagulants, anti-lipid, and/or anti-platelets(Stroke Metric)  Outcome: Progressing Towards Goal  Goal: *Tolerating diet  Outcome: Progressing Towards Goal  Goal: *Aware of follow-up diagnostics related to anticoagulants  Outcome: Progressing Towards Goal  Goal: *Ability to perform ADLs and demonstrates progressive mobility and function  Outcome: Progressing Towards Goal  Goal: *Absence of DVT(Stroke Metric)  Outcome: Progressing Towards Goal  Goal: *Absence of aspiration  Outcome: Progressing Towards Goal  Goal: *Optimal pain control at patient's stated goal  Outcome: Progressing Towards Goal  Goal: *Home safety concerns addressed  Outcome: Progressing Towards Goal  Goal: *Describes available resources and support systems  Outcome: Progressing Towards Goal  Goal: *Verbalizes understanding of activation of EMS(911) for stroke symptoms(Stroke Metric)  Outcome: Progressing Towards Goal  Goal: *Understands and describes signs and symptoms to report to providers(Stroke Metric)  Outcome: Progressing Towards Goal  Goal: *Neurolgocially stable (absence of additional neurological deficits)  Outcome: Progressing Towards Goal  Goal: *Verbalizes importance of follow-up with primary care physician(Stroke Metric)  Outcome: Progressing Towards Goal  Goal: *Smoking cessation discussed,if applicable(Stroke Metric)  Outcome: Progressing Towards Goal  Goal: *Depression screening completed(Stroke Metric)  Outcome: Progressing Towards Goal

## 2022-08-03 NOTE — H&P
[address] [facility] ADULT HOSPITALIST        History & Physical      Date of admission: 8/2/2022    Patient name: John Herrera  MRN: [id number]  YOB: 1970  Age: 47 y.o. Primary care provider:  Zain Bernal     Source of Information: patient, ED and electronic medical records                              Chief complaint:  woke up leaning to the right, right arm numbness, and high blood pressure    History of present illness  John Herrera is a 47 y.o. male with past medical history of hypertension presented to the ED with chief complaint of unsteady gait/ leaning to the right, right arm numbness, and high blood pressure. Patient notes that he initially awoke at 02:30 a.m. (last known well), went back to sleep, awoke at 06:47 a.m. with new onset symptoms (feeling off balance/ leaning to the right when walking/ standing, severe, lasting ~ 5 hours, without specific aggravating/ alleviating factors), with associated loss of spatial orientation and coordination of his entire right upper extremity/ numbness. He notes that he works at home, where at 01:27 p.m., he checked his blood pressure which read \"200/112\". He came to the ED, where on arrival initial recorded vital signs were /110, HR 73, RR 16, O2sat 98% on room air. Patient was given Labetalol 10 mg IV, Aspirin 325 mg po x 2, and Plavix 300 mg po x 1 dose. He notes that his symptoms have resolved and he feels back to \"normal\". His wife comments that patient had \"ocular migraine\" in June 2022 but patient denies any such symptom at current. There was no reported new onset/ recent slurred speech, facial droop, other extremity focal weakness/ numbness, syncope, loss of consciousness, fall, head/ neck/ back trauma, or previous symptoms of TIA/stroke.     PAST MEDICAL HISTORY:  Hypertension    MEDICATIONS:  Prior to Admission medications    Not on File     ALLERGIES:  No Known Allergies      Social history  Patient resides  X  Independently X             Ambulates  x  Independently                 Alcohol history   x  occasional           Smoking history      x  Former smoker several years ago during college but none recently          FAMILY HISTORY:  Unknown as patient is adopted      Review of systems  Pertinent positives as noted in HPI. All other systems were reviewed and were negative     Physical Examination   Visit Vitals  BP (!) 142/86 --> 171/95   Pulse 68   Resp 11   Ht 6' 1\" (1.854 m)   Wt 136.1 kg (300 lb)   SpO2 95%   BMI 39.58 kg/m²          O2 Device: None (Room air)    General:  Patient is in no acute respiratory distress. Head:  Normocephalic, without obvious abnormality, atraumatic   Eyes:  Conjunctivae/corneas clear. PERRLA, EOMs intact   E/N/M/T: Nares normal. Septum midline. No nasal drainage or sinus tenderness  Tongue midline/ non-edematous  Clear oropharynx   Neck: Normal appearance and movements, symmetrical, trachea midline  No palpable adenopathy  No thyroid enlargement, tenderness or nodules  No carotid bruit   No JVD  Trachea midline   Lungs:   Symmetrical chest expansion and respiratory effort  Clear to auscultation bilaterally   Chest wall:  No tenderness or deformity   Heart:  Regular rate and rhythm   Normal S1 and S2; no murmur, click, rub or gallop   Abdomen:   Soft, no tenderness  No rebound, guarding, or rigidity  Non-distended   Bowel sounds normal  No masses or hepatosplenomegaly  No hernias present   Back: No costovertebral angle tenderness  No step-off deformity   Extremities: Extremities normal, atraumatic  No cyanosis or edema     Vascular/  Pulses: 2+ radial/ DP bilateral pulses   Integument/  Skin: No rashes or ulcers  Warm and dry   Musculo-      skeletal: Gait not tested  Normal symmetry, ROM, strength and tone  No calf tenderness   Neuro: GCS 15. Moves all extremities x 4. No slurred speech. No facial droop. Sensation grossly intact. No pronator drift.    Psych: Alert, oriented x 3 I reviewed the following data:    12 lead EKG : normal sinus rhythm @ 71 bpm    24 Hour Results:  Recent Results (from the past 24 hour(s))   CBC WITH AUTOMATED DIFF    Collection Time: 08/02/22  3:20 PM   Result Value Ref Range    WBC 8.5 4.1 - 11.1 K/uL    RBC 4.72 4.10 - 5.70 M/uL    HGB 14.4 12.1 - 17.0 g/dL    HCT 41.6 36.6 - 50.3 %    MCV 88.1 80.0 - 99.0 FL    MCH 30.5 26.0 - 34.0 PG    MCHC 34.6 30.0 - 36.5 g/dL    RDW 12.9 11.5 - 14.5 %    PLATELET 610 122 - 878 K/uL    MPV 10.5 8.9 - 12.9 FL    NRBC 0.0 0  WBC    ABSOLUTE NRBC 0.00 0.00 - 0.01 K/uL    NEUTROPHILS 68 32 - 75 %    LYMPHOCYTES 23 12 - 49 %    MONOCYTES 7 5 - 13 %    EOSINOPHILS 2 0 - 7 %    BASOPHILS 0 0 - 1 %    IMMATURE GRANULOCYTES 0 0.0 - 0.5 %    ABS. NEUTROPHILS 5.8 1.8 - 8.0 K/UL    ABS. LYMPHOCYTES 1.9 0.8 - 3.5 K/UL    ABS. MONOCYTES 0.6 0.0 - 1.0 K/UL    ABS. EOSINOPHILS 0.2 0.0 - 0.4 K/UL    ABS. BASOPHILS 0.0 0.0 - 0.1 K/UL    ABS. IMM. GRANS. 0.0 0.00 - 0.04 K/UL    DF AUTOMATED     METABOLIC PANEL, COMPREHENSIVE    Collection Time: 08/02/22  3:20 PM   Result Value Ref Range    Sodium 139 136 - 145 mmol/L    Potassium 3.5 3.5 - 5.1 mmol/L    Chloride 106 97 - 108 mmol/L    CO2 26 21 - 32 mmol/L    Anion gap 7 5 - 15 mmol/L    Glucose 94 65 - 100 mg/dL    BUN 15 6 - 20 MG/DL    Creatinine 0.98 0.70 - 1.30 MG/DL    BUN/Creatinine ratio 15 12 - 20      GFR est AA >60 >60 ml/min/1.73m2    GFR est non-AA >60 >60 ml/min/1.73m2    Calcium 9.2 8.5 - 10.1 MG/DL    Bilirubin, total 0.6 0.2 - 1.0 MG/DL    ALT (SGPT) 30 12 - 78 U/L    AST (SGOT) 15 15 - 37 U/L    Alk.  phosphatase 65 45 - 117 U/L    Protein, total 7.7 6.4 - 8.2 g/dL    Albumin 3.7 3.5 - 5.0 g/dL    Globulin 4.0 2.0 - 4.0 g/dL    A-G Ratio 0.9 (L) 1.1 - 2.2     PROTHROMBIN TIME + INR    Collection Time: 08/02/22  3:20 PM   Result Value Ref Range    INR 1.0 0.9 - 1.1      Prothrombin time 10.6 9.0 - 11.1 sec   EKG, 12 LEAD, INITIAL    Collection Time: 08/02/22  3:41 PM   Result Value Ref Range    Ventricular Rate 71 BPM    Atrial Rate 71 BPM    P-R Interval 148 ms    QRS Duration 90 ms    Q-T Interval 408 ms    QTC Calculation (Bezet) 443 ms    Calculated P Axis 53 degrees    Calculated R Axis 33 degrees    Calculated T Axis 22 degrees    Diagnosis       Normal sinus rhythm  Normal ECG  No previous ECGs available  Confirmed by Grant Clark M.D., Judy Komalem ([id number]) on 8/2/2022 4:49:52 PM       Recent Labs     08/02/22  1520   WBC 8.5   HGB 14.4   HCT 41.6        Recent Labs     08/02/22  1520      K 3.5      CO2 26   GLU 94   BUN 15   CREA 0.98   CA 9.2   ALB 3.7   TBILI 0.6   ALT 30   INR 1.0       Imaging  CT CODE NEURO HEAD WO CONTRAST     INDICATION: right arm numbness, ataxia     COMPARISON: None. CONTRAST: None. TECHNIQUE: Unenhanced CT of the head was performed using 5 mm images. Brain and  bone windows were generated. Coronal and sagittal reformats. CT dose reduction  was achieved through use of a standardized protocol tailored for this  examination and automatic exposure control for dose modulation. FINDINGS:  The ventricles and sulci are normal in size, shape and configuration. . There is  no significant white matter disease. There is no intracranial hemorrhage,  extra-axial collection, or mass effect. The basilar cisterns are open. No CT  evidence of acute infarct. The bone windows demonstrate no abnormalities. The visualized portions of the  paranasal sinuses and mastoid air cells are clear. IMPRESSION  No acute intracranial abnormality. CTA CODE NEURO HEAD AND NECK W CONT, CT CODE NEURO PERF W CBF     INDICATION: Code Stroke     COMPARISON: None. CONTRAST: 100 mL of Isovue-370. TECHNIQUE:  Unenhanced  images were obtained to localize the volume for  acquisition.   Multislice helical axial CT angiography was performed from the  aortic arch to the top of the head during uneventful rapid bolus intravenous  contrast administration. Coronal and sagittal reformations and 3D post  processing was performed. CT dose reduction was achieved through use of a  standardized protocol tailored for this examination and automatic exposure  control for dose modulation. This study was analyzed by the [address] ai algorithm. CT brain perfusion was performed with generation of hemodynamic maps of multiple  parameters, including cerebral blood flow, cerebral blood volume, and MTT (mean  transit time). CT dose reduction was achieved through use of a standardized  protocol tailored for this examination and automatic exposure control for dose  modulation. FINDINGS:     CTA NECK  There is conventional three vessel arch anatomy. The bilateral subclavian,  common carotid, and internal carotid arteries are patent with no flow-limiting  stenosis. % of right carotid artery stenosis: No significant stenosis  % of left carotid artery stenosis: No significant stenosis     NASCET method was utilized for calculating stenosis. Left vertebral artery is dominant. .  The cervical soft tissues are unremarkable. There are degenerative changes of the cervical spine. CTA HEAD  Left vertebral artery is dominant. There is mild stenosis of the left V4  segment. There is severe multifocal stenosis of the right V4 segment. Fetal  origin of the left posterior cerebral artery. Mild stenosis of the proximal  right P2 segment. There is moderate stenosis of the left P2 segment. . Moderate  to severe stenosis of the anterior right M2 segment. . . There is no aneurysm. .     CT PERFUSION:     There are no regional areas of elevated Tmax, decreased cerebral blood flow or  blood volume. rCBF < 30% = 0 cc. Tmax > 6 seconds = 0 cc. IMPRESSION     1. Intracranial atherosclerotic disease with multifocal stenoses as described. 2.  No large vessel occlusion is identified. 3.  Other incidental findings as above.            Assessment and Plan      TIA (transient ischemic attack)   -admit to neuro/ stroke floor  -place on neuro checks  -fall precautions  -order MRI brain without IV contrast  -order 2d echocardiogram  -check lipid panel  -already given Aspirin 325 mg po x 2 and Plavix 300 mg   -per neuro consult order P2Y12 test after loading dose  -consult PT  -nursing dysphagia screening and if passes, start heart healthy diet    2. Acute right-sided weakness   -with spatial incoordination- resolved  -plan as above    3. Hypertensive urgency   -not taking any home BP medication  -already given Labetalol 10 mg IV x 1 dose  -BP decreased  -per discussion with ER MD who spoke with tele neurologist, recommendation is for permissive hypertension  -monitor BP closely and consider for starting anti-hypertensive therapy prior to discharge    4. Right sided numbness (  -plan as noted    5. Class 3 obesity  -would encourage eventual weight loss, dietary and lifestyle modifications      VTE prophylaxis  -SCDs to BLEs    ADVANCED DIRECTIVE/ CODE STATUS:  FULL CODE as per discussion with patient and his wife who was at the bedside.            Signed by: Kalin Yun MD    August 2, 2022 at 9:18 PM

## 2022-08-03 NOTE — PROGRESS NOTES
[facility] [facility] Adult  Hospitalist Group                                                                                          Hospitalist Progress Note  [address],   Answering service: [phone] from in house phone        Date of Service:  8/3/2022  NAME:  Christy Romo  :  1970  MRN:  [id number]      Admission Summary:   47 y.o. male with past medical history of hypertension presented to the ED with chief complaint of unsteady gait/ leaning to the right, right arm numbness, and high blood pressure. Patient notes that he initially awoke at 02:30 a.m. (last known well), went back to sleep, awoke at 06:47 a.m. with new onset symptoms (feeling off balance/ leaning to the right when walking/ standing, severe, lasting ~ 5 hours, without specific aggravating/ alleviating factors), with associated loss of spatial orientation and coordination of his entire right upper extremity/ numbness. He notes that he works at home, where at 01:27 p.m., he checked his blood pressure which read \"200/112\". He came to the ED, where on arrival initial recorded vital signs were /110, HR 73, RR 16, O2sat 98% on room air. Patient was given Labetalol 10 mg IV, Aspirin 325 mg po x 2, and Plavix 300 mg po x 1 dose. He notes that his symptoms have resolved and he feels back to \"normal\". His wife comments that patient had \"ocular migraine\" in 2022 but patient denies any such symptom at current. There was no reported new onset/ recent slurred speech, facial droop, other extremity focal weakness/ numbness, syncope, loss of consciousness, fall, head/ neck/ back trauma, or previous symptoms of TIA/stroke. Interval history / Subjective: Follow up TIA. Patient seen and examined earlier this afternoon. Patient without neuro symptoms since admission. MRI completed, pending read.       Assessment & Plan:        TIA (transient ischemic attack)  -presented with unsteady gait/leaning to the right, arm numbness  -continue care on neuro/ stroke floor  -MRI brain ordered and pending  -echocardiogram completed and EF normal, no shunt noted  -LDL 97, initiated on statin, goal LDL <70  -continue aspirin, plavix   -Ha1c 5.3  -CTA with no large vessel occlusion, does demonstrate severe stenosis right V4 segment. Moderate stenosis left P2, right M2 segment    Hypertensive emergency:   -allowed permissive hypertension initially  -will slowly add antihypertensives. Discussed with neurology and current goal SBP >140      Class 3 obesity  -would encourage eventual weight loss, dietary and lifestyle modifications          Code status: full   Prophylaxis: ambulatory   Care Plan discussed with: patient, nurse  Anticipated Disposition: <24 hours     Hospital Problems  Never Reviewed            Codes Class Noted POA    TIA (transient ischemic attack) ICD-10-CM: G45.9  ICD-9-CM: 435.9  8/2/2022 Unknown        Acute right-sided weakness ICD-10-CM: R53.1  ICD-9-CM: 728.87  8/2/2022 Unknown        Hypertensive urgency ICD-10-CM: I16.0  ICD-9-CM: 401.9  8/2/2022 Unknown        Right sided numbness ICD-10-CM: R20.0  ICD-9-CM: 782.0  8/2/2022 Unknown             Review of Systems:   A comprehensive review of systems was negative except for that written in the HPI. Vital Signs:    Last 24hrs VS reviewed since prior progress note. Most recent are:  Visit Vitals  BP (!) 161/104   Pulse 65   Temp 98 °F (36.7 °C)   Resp 21   Ht 6' 1\" (1.854 m)   Wt 136.1 kg (300 lb 0.7 oz)   SpO2 97%   BMI 39.59 kg/m²       No intake or output data in the 24 hours ending 08/03/22 1710     Physical Examination:     I had a face to face encounter with this patient and independently examined them on 8/3/2022 as outlined below:          Constitutional:  No acute distress, cooperative, pleasant    ENT:  Oral mucosa moist, oropharynx benign. Resp:  CTA bilaterally. No wheezing/rhonchi/rales. No accessory muscle use.     CV:  Regular rhythm, normal rate, no murmurs, gallops, rubs    GI:  Soft, non distended, non tender. normoactive bowel sounds, no hepatosplenomegaly     Musculoskeletal:  No edema, warm, 2+ pulses throughout    Neurologic:  Moves all extremities. AAOx3, CN II-XII reviewed            Data Review:    Review and/or order of clinical lab test  Review and/or order of tests in the radiology section of CPT  Review and/or order of tests in the medicine section of CPT      Labs:     Recent Labs     08/02/22  1520   WBC 8.5   HGB 14.4   HCT 41.6        Recent Labs     08/02/22  1520      K 3.5      CO2 26   BUN 15   CREA 0.98   GLU 94   CA 9.2     Recent Labs     08/02/22  1520   ALT 30   AP 65   TBILI 0.6   TP 7.7   ALB 3.7   GLOB 4.0     Recent Labs     08/02/22  1520   INR 1.0   PTP 10.6      No results for input(s): FE, TIBC, PSAT, FERR in the last 72 hours. No results found for: FOL, RBCF   No results for input(s): PH, PCO2, PO2 in the last 72 hours. No results for input(s): CPK, CKNDX, TROIQ in the last 72 hours.     No lab exists for component: CPKMB  Lab Results   Component Value Date/Time    Cholesterol, total 189 08/03/2022 04:24 AM    HDL Cholesterol 77 08/03/2022 04:24 AM    LDL, calculated 97.4 08/03/2022 04:24 AM    Triglyceride 73 08/03/2022 04:24 AM    CHOL/HDL Ratio 2.5 08/03/2022 04:24 AM     No results found for: GLUCPOC  No results found for: COLOR, APPRN, SPGRU, REFSG, RPAKASH, PROTU, GLUCU, KETU, BILU, UROU, MENDY, LEUKU, GLUKE, EPSU, BACTU, WBCU, RBCU, CASTS, UCRY      Medications Reviewed:     Current Facility-Administered Medications   Medication Dose Route Frequency    aspirin delayed-release tablet 81 mg  81 mg Oral DAILY    clopidogreL (PLAVIX) tablet 75 mg  75 mg Oral DAILY    atorvastatin (LIPITOR) tablet 40 mg  40 mg Oral QHS    amLODIPine (NORVASC) tablet 2.5 mg  2.5 mg Oral DAILY    acetaminophen (TYLENOL) tablet 650 mg  650 mg Oral Q4H PRN    Or    acetaminophen (TYLENOL) solution 650 mg  650 mg Per NG tube Q4H PRN    Or    acetaminophen (TYLENOL) suppository 650 mg  650 mg Rectal Q4H PRN    labetaloL (NORMODYNE;TRANDATE) injection 5 mg  5 mg IntraVENous Q10MIN PRN     ______________________________________________________________________  EXPECTED LENGTH OF STAY: - - -  ACTUAL LENGTH OF STAY:          0                 Chelly Zhang DO

## 2022-08-03 NOTE — PROGRESS NOTES
I prayed with Telma Kinsey and celebrated with him the Anointing of the Sick.   Father Mariela Barroso

## 2022-08-03 NOTE — PROGRESS NOTES
Rob Clay provided to patient/representative with verbal explanation of the notice. Time allotted for questions regarding the notice. Patient /representative provided a completed copy of the VOON notice. Copy placed on bedside chart.   Merriam Boxer, Care Management Assistant

## 2022-08-03 NOTE — PROGRESS NOTES
Spiritual Care Assessment/Progress Note  [facility]      NAME: Yane Soliz      MRN: [id number]  AGE: 47 y.o. SEX: male  Church Affiliation: Mandaen   Language: English     8/3/2022     Total Time (in minutes): 5     Spiritual Assessment begun in [address] through conversation with:         [x]Patient        [x] Family    [] Friend(s)        Reason for Consult: [facility]     Spiritual beliefs: (Please include comment if needed)     [x] Identifies with a sergio tradition:  Mandaen       [] Supported by a sergio community:            [] Claims no spiritual orientation:           [] Seeking spiritual identity:                [] Adheres to an individual form of spirituality:           [] Not able to assess:                           Identified resources for coping:      [x] Prayer                               [x] Music                  [] Guided Imagery     [x] Family/friends                 [] Pet visits     [] Devotional reading                         [] Unknown     [] Other:                                              Interventions offered during this visit: (See comments for more details)    Patient Interventions: Affirmation of sergio, Communion [employer]), Initial/Spiritual assessment, patient floor, Prayer (actual), Prayer (assurance of)     Family/Friend(s):  Affirmation of sergio, Communion (Mandaen), Prayer (actual), Prayer (assurance of)     Plan of Care:     [x] Support spiritual and/or cultural needs    [] Support AMD and/or advance care planning process      [] Support grieving process   [] Coordinate Rites and/or Rituals    [] Coordination with community clergy   [] No spiritual needs identified at this time   [] Detailed Plan of Care below (See Comments)  [] Make referral to Music Therapy  [] Make referral to Pet Therapy     [] Make referral to Addiction services  [] Make referral to [facility]  [] Make referral to Spiritual Care Partner  [] No future visits requested        [] Contact Spiritual Care for further referrals     Comments: Mr. Wynn Nissen was in bed. He and his wife received prayer and communion. He is hoping to go home this afternoon. He expressed his appreciation for the prayers over the intercom and the symbols of his [address] around him.     ELMER Wiggins, RN, ACSW, LCSW   Page:  [phone]

## 2022-08-04 VITALS
TEMPERATURE: 98.2 F | HEIGHT: 73 IN | DIASTOLIC BLOOD PRESSURE: 85 MMHG | SYSTOLIC BLOOD PRESSURE: 160 MMHG | BODY MASS INDEX: 41.02 KG/M2 | OXYGEN SATURATION: 98 % | RESPIRATION RATE: 15 BRPM | WEIGHT: 309.5 LBS | HEART RATE: 63 BPM

## 2022-08-04 PROCEDURE — 99214 OFFICE O/P EST MOD 30 MIN: CPT | Performed by: PSYCHIATRY & NEUROLOGY

## 2022-08-04 PROCEDURE — 74011250637 HC RX REV CODE- 250/637: Performed by: PSYCHIATRY & NEUROLOGY

## 2022-08-04 PROCEDURE — 74011250637 HC RX REV CODE- 250/637: Performed by: INTERNAL MEDICINE

## 2022-08-04 PROCEDURE — G0378 HOSPITAL OBSERVATION PER HR: HCPCS

## 2022-08-04 PROCEDURE — 74011250637 HC RX REV CODE- 250/637: Performed by: HOSPITALIST

## 2022-08-04 RX ORDER — ATORVASTATIN CALCIUM 40 MG/1
40 TABLET, FILM COATED ORAL
Qty: 30 TABLET | Refills: 0 | Status: SHIPPED | OUTPATIENT
Start: 2022-08-04

## 2022-08-04 RX ORDER — AMLODIPINE BESYLATE 5 MG/1
5 TABLET ORAL DAILY
Status: DISCONTINUED | OUTPATIENT
Start: 2022-08-05 | End: 2022-08-04 | Stop reason: HOSPADM

## 2022-08-04 RX ORDER — AMLODIPINE BESYLATE 5 MG/1
5 TABLET ORAL DAILY
Qty: 30 TABLET | Refills: 0 | Status: SHIPPED | OUTPATIENT
Start: 2022-08-05

## 2022-08-04 RX ORDER — ASPIRIN 81 MG/1
81 TABLET ORAL DAILY
Qty: 30 TABLET | Refills: 0 | Status: SHIPPED | OUTPATIENT
Start: 2022-08-05

## 2022-08-04 RX ORDER — AMLODIPINE BESYLATE 5 MG/1
2.5 TABLET ORAL ONCE
Status: COMPLETED | OUTPATIENT
Start: 2022-08-04 | End: 2022-08-04

## 2022-08-04 RX ORDER — CLOPIDOGREL BISULFATE 75 MG/1
75 TABLET ORAL DAILY
Qty: 30 TABLET | Refills: 0 | Status: SHIPPED | OUTPATIENT
Start: 2022-08-05

## 2022-08-04 RX ADMIN — CLOPIDOGREL BISULFATE 75 MG: 75 TABLET ORAL at 09:02

## 2022-08-04 RX ADMIN — ASPIRIN 81 MG: 81 TABLET, COATED ORAL at 09:02

## 2022-08-04 RX ADMIN — AMLODIPINE BESYLATE 2.5 MG: 5 TABLET ORAL at 09:01

## 2022-08-04 RX ADMIN — AMLODIPINE BESYLATE 2.5 MG: 5 TABLET ORAL at 12:29

## 2022-08-04 NOTE — PROGRESS NOTES
2000 Received report from [facility] and [facility]. 0730 Bedside and Verbal shift change report given to Marisol Kline (oncoming nurse) by Lawrence Cordoba (offgoing nurse). Report included the following information SBAR, Kardex, Intake/Output, MAR, and Recent Results.

## 2022-08-04 NOTE — PROGRESS NOTES
Problem: Patient Education: Go to Patient Education Activity  Goal: Patient/Family Education  Outcome: Resolved/Met     Problem: TIA/CVA Stroke: 0-24 hours  Goal: Off Pathway (Use only if patient is Off Pathway)  Outcome: Resolved/Met     Problem: TIA/CVA Stroke: Day 2 Until Discharge  Goal: Off Pathway (Use only if patient is Off Pathway)  Outcome: Resolved/Met  Goal: Activity/Safety  Outcome: Resolved/Met  Goal: Diagnostic Test/Procedures  Outcome: Resolved/Met  Goal: Nutrition/Diet  Outcome: Resolved/Met  Goal: Discharge Planning  Outcome: Resolved/Met  Goal: Medications  Outcome: Resolved/Met  Goal: Respiratory  Outcome: Resolved/Met  Goal: Treatments/Interventions/Procedures  Outcome: Resolved/Met  Goal: Psychosocial  Outcome: Resolved/Met  Goal: *Verbalizes anxiety and depression are reduced or absent  Outcome: Resolved/Met  Goal: *Absence of aspiration  Outcome: Resolved/Met  Goal: *Absence of deep venous thrombosis signs and symptoms(Stroke Metric)  Outcome: Resolved/Met  Goal: *Optimal pain control at patient's stated goal  Outcome: Resolved/Met  Goal: *Tolerating diet  Outcome: Resolved/Met  Goal: *Ability to perform ADLs and demonstrates progressive mobility and function  Outcome: Resolved/Met  Goal: *Stroke education continued(Stroke Metric)  Outcome: Resolved/Met     Problem: Ischemic Stroke: Discharge Outcomes  Goal: *Verbalizes anxiety and depression are reduced or absent  Outcome: Resolved/Met  Goal: *Verbalize understanding of risk factor modification(Stroke Metric)  Outcome: Resolved/Met  Goal: *Hemodynamically stable  Outcome: Resolved/Met  Goal: *Absence of aspiration pneumonia  Outcome: Resolved/Met  Goal: *Aware of needed dietary changes  Outcome: Resolved/Met  Goal: *Verbalize understanding of prescribed medications including anti-coagulants, anti-lipid, and/or anti-platelets(Stroke Metric)  Outcome: Resolved/Met  Goal: *Tolerating diet  Outcome: Resolved/Met  Goal: *Aware of follow-up diagnostics related to anticoagulants  Outcome: Resolved/Met  Goal: *Ability to perform ADLs and demonstrates progressive mobility and function  Outcome: Resolved/Met  Goal: *Absence of DVT(Stroke Metric)  Outcome: Resolved/Met  Goal: *Absence of aspiration  Outcome: Resolved/Met  Goal: *Optimal pain control at patient's stated goal  Outcome: Resolved/Met  Goal: *Home safety concerns addressed  Outcome: Resolved/Met  Goal: *Describes available resources and support systems  Outcome: Resolved/Met  Goal: *Verbalizes understanding of activation of EMS(911) for stroke symptoms(Stroke Metric)  Outcome: Resolved/Met  Goal: *Understands and describes signs and symptoms to report to providers(Stroke Metric)  Outcome: Resolved/Met  Goal: *Neurolgocially stable (absence of additional neurological deficits)  Outcome: Resolved/Met  Goal: *Verbalizes importance of follow-up with primary care physician(Stroke Metric)  Outcome: Resolved/Met  Goal: *Smoking cessation discussed,if applicable(Stroke Metric)  Outcome: Resolved/Met  Goal: *Depression screening completed(Stroke Metric)  Outcome: Resolved/Met     Problem: Discharge Planning  Goal: *Discharge to safe environment  Outcome: Resolved/Met     Problem: Falls - Risk of  Goal: *Absence of Falls  Description: Document Db Fall Risk and appropriate interventions in the flowsheet. Outcome: Resolved/Met     Problem: Patient Education: Go to Patient Education Activity  Goal: Patient/Family Education  Outcome: Resolved/Met     Problem: Pressure Injury - Risk of  Goal: *Prevention of pressure injury  Description: Document Igor Scale and appropriate interventions in the flowsheet.   Outcome: Resolved/Met     Problem: Patient Education: Go to Patient Education Activity  Goal: Patient/Family Education  Outcome: Resolved/Met     Problem: Pain  Goal: *Control of Pain  Outcome: Resolved/Met  Goal: *PALLIATIVE CARE:  Alleviation of Pain  Outcome: Resolved/Met     Problem: Patient Education: Go to Patient Education Activity  Goal: Patient/Family Education  Outcome: Resolved/Met

## 2022-08-04 NOTE — CONSULTS
Neurology Progress Note    Patient ID:  Jeanine Diaz  [id number]  47 y.o.  1970    Chief Complaint: Poor coordination    Subjective:     48-year-old gentleman with poorly controlled hypertension who had strokelike symptoms on presentation transiently. He is back to baseline. MRI without stroke but he does have intracranial atherosclerosis found on CTA. He feels well today. Gets a little lightheaded while working with PT. Blood pressure being addressed. Objective:       ROS:  Per HPI  All other 12 pt ROS negative    Meds:  Current Facility-Administered Medications   Medication Dose Route Frequency    aspirin delayed-release tablet 81 mg  81 mg Oral DAILY    clopidogreL (PLAVIX) tablet 75 mg  75 mg Oral DAILY    atorvastatin (LIPITOR) tablet 40 mg  40 mg Oral QHS    amLODIPine (NORVASC) tablet 2.5 mg  2.5 mg Oral DAILY    acetaminophen (TYLENOL) tablet 650 mg  650 mg Oral Q4H PRN    Or    acetaminophen (TYLENOL) solution 650 mg  650 mg Per NG tube Q4H PRN    Or    acetaminophen (TYLENOL) suppository 650 mg  650 mg Rectal Q4H PRN    labetaloL (NORMODYNE;TRANDATE) injection 5 mg  5 mg IntraVENous Q10MIN PRN       MRI Results (maximum last 3): Results from [place] encounter on 08/02/22    MRI BRAIN WO CONT    Narrative  EXAM: MRI BRAIN WO CONT    INDICATION: stroke eval    COMPARISON: CTA 8/2/2022. CONTRAST: None. TECHNIQUE:  Multiplanar multisequence acquisition without contrast of the brain. FINDINGS:  The ventricles are normal in size and position. There is no acute infarct,  hemorrhage, extra-axial fluid collection, or mass effect. There is no cerebellar  tonsillar herniation. Expected arterial flow-voids are present. The paranasal sinuses, mastoid air cells, and middle ears are clear. The orbital  contents are within normal limits. No significant osseous or scalp lesions are  identified. Impression  Unremarkable MRI of the brain.       Lab Review   Recent Results (from the past 24 hour(s))   ECHO ADULT COMPLETE    Collection Time: 08/03/22 11:50 AM   Result Value Ref Range    IVSd 0.9 0.6 - 1.0 cm    LVIDd 5.1 4.2 - 5.9 cm    LVIDs 2.4 cm    LVPWd 1.0 0.6 - 1.0 cm    LVOT Peak Gradient 3 mmHg    LVOT Peak Velocity 0.9 m/s    AV Peak Gradient 6 mmHg    AV Peak Velocity 1.2 m/s    MV A Velocity 0.63 m/s    MV E Velocity 0.67 m/s    LV E' Lateral Velocity 9 cm/s    LV E' Septal Velocity 9 cm/s    Fractional Shortening 2D 53 28 - 44 %    LVIDd Index 1.99 cm/m2    LVIDs Index 0.94 cm/m2    LV RWT Ratio 0.39     LV Mass 2D 175.6 88 - 224 g    LV Mass 2D Index 68.6 49 - 115 g/m2    MV E/A 1.06     E/E' Ratio (Averaged) 7.44     E/E' Lateral 7.44     E/E' Septal 7.44     AV Velocity Ratio 0.75    ASPIRIN TEST    Collection Time: 08/03/22  4:35 PM   Result Value Ref Range    Aspirin test 377 ARU       Additional comments:I reviewed the patient's new clinical lab test results. and I reviewed the patients new imaging test results. Patient Vitals for the past 8 hrs:   BP Temp Pulse Resp SpO2 Weight   08/04/22 0620 (!) 151/101 98.1 °F (36.7 °C) 66 12 96 % --   08/04/22 0547 -- -- (!) 56 -- -- --   08/04/22 0400 -- -- (!) 59 -- -- --   08/04/22 0348 -- -- (!) 58 -- -- --   08/04/22 0200 (!) 135/90 97.8 °F (36.6 °C) 65 14 96 % 309 lb 8 oz (140.4 kg)   08/04/22 0153 -- -- (!) 57 -- -- --       No intake/output data recorded. No intake/output data recorded.     Exam:  Visit Vitals  BP (!) 151/101 (BP 1 Location: Right upper arm, BP Patient Position: At rest)   Pulse 66   Temp 98.1 °F (36.7 °C)   Resp 12   Ht 6' 1\" (1.854 m)   Wt 309 lb 8 oz (140.4 kg)   SpO2 96%   BMI 40.83 kg/m²     Gen: Well developed  CV: RRR  Lungs: non labored breathing  Abd: soft, non distended  Neuro: A&O x 3, no dysarthria or aphasia  CN II-XII: PERRL, EOMI, face symmetric, tongue/palate midline  Motor: Moving all extremities spontaneously with purpose supine without ataxia  Sensory: intact to LT  DTRs: symmetric  COOR: no limb/truncal ataxia  Gait: Deferred    PROBLEM LIST:     Patient Active Problem List   Diagnosis Code    TIA (transient ischemic attack) G45.9    Acute right-sided weakness R53.1    Hypertensive urgency I16.0    Right sided numbness R20.0       Assessment/Plan:      79-year-old gentleman whom I suspect presented with an acute transient ischemic attack fortunately without evidence of acute injury on imaging. He is high risk for repeat events so I think he needs to be on dual antiplatelets given the stenosis seen in the brain and plaque buildup. Blood pressure goals ideally to start around 140s-150s to allow for good perfusion to the brain. He will need to follow-up with his primary care physician for long-term control of his blood pressure. Avoid hypotension given the plaque buildup in the brain. Hopefully with better improvement in his health and weight loss his risk will lower in the future. I have no additional tests. Discharge planning. 30 minutes of time in total was taken today reviewing the medical record to include personal review of the neuroimaging by me, bedside time with the patient discussing with him and his wife, discussion with the hospitalist and time completing documentation today. During this evaluation, we also discussed stroke education to include signs and symptoms of stroke and TIA. This clinical note was dictated with an electronic dictation software that can make unintentional errors. If there are any questions, please contact me directly for clarification.       Signed:  [facility], DO  8/4/2022  8:46 AM

## 2022-08-04 NOTE — PROGRESS NOTES
I have reviewed discharge instructions with the patient and spouse. The patient and spouse verbalized understanding. Bedside RN performed patient education and medication education. Discharge concerns initiated and discussed with patient, including clarification on \"who\" assists the patient at their home and instructions for when the home going patient should call their provider after discharge. Opportunity for questions and clarification was provided. Patient receptive to education: YES  Patient stated: Verbalized Understanding  Barriers to Education: N/A  Diagnosis Education given:  YES    Length of stay: 2 days  Expected Day of Discharge: 8/4/2022  Ask if they have \"Help at Home\" & add to white board?   YES    Education Day #: 2    Medication Education Given:  YES  M in the box Medication name: ASA, Plavix, Lipitor, Norvasc     Pt aware of HCAHPS survey: YES          Stroke Education documented in Patient Education: YES  Core Measures Documented in [facility]:  Risk Factors: YES  Warning signs of stroke: YES  When to Activate 911: YES  Medication Education for Risk Factors: YES  Smoking cessation if applicable: YES  Written Education Given:  YES    Discharge NIH Completed: YES  Score: 0

## 2022-08-04 NOTE — DISCHARGE INSTRUCTIONS
Discharge Instructions       PATIENT ID: Gely Goodwin  MRN: [id number]   YOB: 1970    DATE OF ADMISSION: [unfilled]    DATE OF DISCHARGE: 8/4/2022    PRIMARY CARE PROVIDER: @PCP@     ATTENDING PHYSICIAN: [unfilled]  DISCHARGING PROVIDER: Matthew Rodriguez MD    To contact this individual call [phone] and ask the  to page. If unavailable ask to be transferred the Adult Hospitalist Department. DISCHARGE DIAGNOSES TIA, HTN    CONSULTATIONS: [unfilled]    PROCEDURES/SURGERIES: * No surgery found *    PENDING TEST RESULTS:   At the time of discharge the following test results are still pending: none    FOLLOW UP APPOINTMENTS:   [unfilled]     ADDITIONAL CARE RECOMMENDATIONS:   Check BP everyday and goal is 140-150s, make a record of BP and follow up with PCP. Call PCP if further medication adjustments needed. DIET: Regular Diet      ACTIVITY: Activity as tolerated    WOUND CARE: na    EQUIPMENT needed: na      Radiology:    XR Results (maximum last 3): No results found for this or any previous visit. CT Results (maximum last 3): Results from Hospital Encounter encounter on 08/02/22    CT CODE NEURO PERF W CBF    Narrative  EXAM: CTA CODE NEURO HEAD AND NECK W CONT, CT CODE NEURO PERF W CBF    INDICATION: Code Stroke    COMPARISON: None. CONTRAST: 100 mL of Isovue-370. TECHNIQUE:  Unenhanced  images were obtained to localize the volume for  acquisition. Multislice helical axial CT angiography was performed from the  aortic arch to the top of the head during uneventful rapid bolus intravenous  contrast administration. Coronal and sagittal reformations and 3D post  processing was performed. CT dose reduction was achieved through use of a  standardized protocol tailored for this examination and automatic exposure  control for dose modulation. This study was analyzed by the [address] ai algorithm.     CT brain perfusion was performed with generation of hemodynamic maps of multiple  parameters, including cerebral blood flow, cerebral blood volume, and MTT (mean  transit time). CT dose reduction was achieved through use of a standardized  protocol tailored for this examination and automatic exposure control for dose  modulation. FINDINGS:    CTA NECK  There is conventional three vessel arch anatomy. The bilateral subclavian,  common carotid, and internal carotid arteries are patent with no flow-limiting  stenosis. % of right carotid artery stenosis: No significant stenosis  % of left carotid artery stenosis: No significant stenosis    NASCET method was utilized for calculating stenosis. Left vertebral artery is dominant. .  The cervical soft tissues are unremarkable. There are degenerative changes of the cervical spine. CTA HEAD  Left vertebral artery is dominant. There is mild stenosis of the left V4  segment. There is severe multifocal stenosis of the right V4 segment. Fetal  origin of the left posterior cerebral artery. Mild stenosis of the proximal  right P2 segment. There is moderate stenosis of the left P2 segment. . Moderate  to severe stenosis of the anterior right M2 segment. . . There is no aneurysm. .    CT PERFUSION:    There are no regional areas of elevated Tmax, decreased cerebral blood flow or  blood volume. rCBF < 30% = 0 cc. Tmax > 6 seconds = 0 cc. Impression  1. Intracranial atherosclerotic disease with multifocal stenoses as described. 2.  No large vessel occlusion is identified. 3.  Other incidental findings as above. CTA CODE NEURO HEAD AND NECK W CONT    Narrative  EXAM: CTA CODE NEURO HEAD AND NECK W CONT, CT CODE NEURO PERF W CBF    INDICATION: Code Stroke    COMPARISON: None. CONTRAST: 100 mL of Isovue-370. TECHNIQUE:  Unenhanced  images were obtained to localize the volume for  acquisition.   Multislice helical axial CT angiography was performed from the  aortic arch to the top of the head during uneventful rapid bolus intravenous  contrast administration. Coronal and sagittal reformations and 3D post  processing was performed. CT dose reduction was achieved through use of a  standardized protocol tailored for this examination and automatic exposure  control for dose modulation. This study was analyzed by the [address] ai algorithm. CT brain perfusion was performed with generation of hemodynamic maps of multiple  parameters, including cerebral blood flow, cerebral blood volume, and MTT (mean  transit time). CT dose reduction was achieved through use of a standardized  protocol tailored for this examination and automatic exposure control for dose  modulation. FINDINGS:    CTA NECK  There is conventional three vessel arch anatomy. The bilateral subclavian,  common carotid, and internal carotid arteries are patent with no flow-limiting  stenosis. % of right carotid artery stenosis: No significant stenosis  % of left carotid artery stenosis: No significant stenosis    NASCET method was utilized for calculating stenosis. Left vertebral artery is dominant. .  The cervical soft tissues are unremarkable. There are degenerative changes of the cervical spine. CTA HEAD  Left vertebral artery is dominant. There is mild stenosis of the left V4  segment. There is severe multifocal stenosis of the right V4 segment. Fetal  origin of the left posterior cerebral artery. Mild stenosis of the proximal  right P2 segment. There is moderate stenosis of the left P2 segment. . Moderate  to severe stenosis of the anterior right M2 segment. . . There is no aneurysm. .    CT PERFUSION:    There are no regional areas of elevated Tmax, decreased cerebral blood flow or  blood volume. rCBF < 30% = 0 cc. Tmax > 6 seconds = 0 cc. Impression  1. Intracranial atherosclerotic disease with multifocal stenoses as described. 2.  No large vessel occlusion is identified. 3.  Other incidental findings as above.       CT CODE NEURO HEAD WO CONTRAST    Narrative  EXAM: CT CODE NEURO HEAD WO CONTRAST    INDICATION: right arm numbness, ataxia    COMPARISON: None. CONTRAST: None. TECHNIQUE: Unenhanced CT of the head was performed using 5 mm images. Brain and  bone windows were generated. Coronal and sagittal reformats. CT dose reduction  was achieved through use of a standardized protocol tailored for this  examination and automatic exposure control for dose modulation. FINDINGS:  The ventricles and sulci are normal in size, shape and configuration. . There is  no significant white matter disease. There is no intracranial hemorrhage,  extra-axial collection, or mass effect. The basilar cisterns are open. No CT  evidence of acute infarct. The bone windows demonstrate no abnormalities. The visualized portions of the  paranasal sinuses and mastoid air cells are clear. Impression  No acute intracranial abnormality. MRI Results (maximum last 3): Results from [place] encounter on 08/02/22    MRI BRAIN WO CONT    Narrative  EXAM: MRI BRAIN WO CONT    INDICATION: stroke eval    COMPARISON: CTA 8/2/2022. CONTRAST: None. TECHNIQUE:  Multiplanar multisequence acquisition without contrast of the brain. FINDINGS:  The ventricles are normal in size and position. There is no acute infarct,  hemorrhage, extra-axial fluid collection, or mass effect. There is no cerebellar  tonsillar herniation. Expected arterial flow-voids are present. The paranasal sinuses, mastoid air cells, and middle ears are clear. The orbital  contents are within normal limits. No significant osseous or scalp lesions are  identified. Impression  Unremarkable MRI of the brain. DISCHARGE MEDICATIONS:   See Medication Reconciliation Form    It is important that you take the medication exactly as they are prescribed.    Keep your medication in the bottles provided by the pharmacist and keep a list of the medication names, dosages, and times to be taken in your wallet. Do not take other medications without consulting your doctor. NOTIFY YOUR PHYSICIAN FOR ANY OF THE FOLLOWING:   Fever over 101 degrees for 24 hours. Chest pain, shortness of breath, fever, chills, nausea, vomiting, diarrhea, change in mentation, falling, weakness, bleeding. Severe pain or pain not relieved by medications. Or, any other signs or symptoms that you may have questions about.       DISPOSITION:    Home With:   OT  PT  HH  RN       SNF/Inpatient Rehab/LTAC    Independent/assisted living    Hospice    Other:     CDMP Checked:         Signed:   Martin Fernandez MD  8/4/2022  2:39 PM

## 2022-08-06 NOTE — DISCHARGE SUMMARY
Discharge Summary       PATIENT ID: Mariah Curry  MRN: [id number]   YOB: 1970    DATE OF ADMISSION: 8/2/2022  3:19 PM    DATE OF DISCHARGE: 8/4/2022  PRIMARY CARE PROVIDER: BENITO Martínez     ATTENDING PHYSICIAN: Clayton Milan MD    DISCHARGING PROVIDER: Jeanine Cardona MD    To contact this individual call [phone] and ask the  to page. If unavailable ask to be transferred the Adult Hospitalist Department. CONSULTATIONS: IP CONSULT TO NEUROLOGY    PROCEDURES/SURGERIES: * No surgery found *    DISCHARGE DIAGNOSES:   ADMISSION SUMMARY AND HOSPITAL COURSE:   DISCHARGE DIAGNOSES / PLAN:    47 y.o. male with past medical history of hypertension presented to the ED with chief complaint of unsteady gait/ leaning to the right, right arm numbness, and high blood pressure. Patient notes that he initially awoke at 02:30 a.m. (last known well), went back to sleep, awoke at 06:47 a.m. with new onset symptoms (feeling off balance/ leaning to the right when walking/ standing, severe, lasting ~ 5 hours, without specific aggravating/ alleviating factors), with associated loss of spatial orientation and coordination of his entire right upper extremity/ numbness. He notes that he works at home, where at 01:27 p.m., he checked his blood pressure which read \"200/112\". He came to the ED, where on arrival initial recorded vital signs were /110, HR 73, RR 16, O2sat 98% on room air. Patient was given Labetalol 10 mg IV, Aspirin 325 mg po x 2, and Plavix 300 mg po x 1 dose. He notes that his symptoms have resolved and he feels back to \"normal\". His wife comments that patient had \"ocular migraine\" in June 2022 but patient denies any such symptom at current. There was no reported new onset/ recent slurred speech, facial droop, other extremity focal weakness/ numbness, syncope, loss of consciousness, fall, head/ neck/ back trauma, or previous symptoms of TIA/stroke.     TIA (transient ischemic attack)  -presented with unsteady gait/leaning to the right, arm numbness  -continue care on neuro/ stroke floor  -MRI brain negative  -echocardiogram completed and EF normal, no shunt noted  -LDL 97, initiated on statin, goal LDL <70  -continue aspirin, plavix  -Ha1c 5.3  -CTA with no large vessel occlusion, does demonstrate severe stenosis right V4 segment. Moderate stenosis left P2, right M2 segment      Per neuro note  \"47year-old gentleman whom I suspect presented with an acute transient ischemic attack fortunately without evidence of acute injury on imaging. He is high risk for repeat events so I think he needs to be on dual antiplatelets given the stenosis seen in the brain and plaque buildup. Blood pressure goals ideally to start around 140s-150s to allow for good perfusion to the brain. He will need to follow-up with his primary care physician for long-term control of his blood pressure. Avoid hypotension given the plaque buildup in the brain. Hopefully with better improvement in his health and weight loss his risk will lower in the future. \"       Hypertensive emergency:resolved  HTN  -allowed permissive hypertension initially  -BP controlled with amlodipine, OP follow up  Discussed with patient and wife -BP goal 140-150s       Class 3 obesity  -would encourage eventual weight loss, dietary and lifestyle modifications       MRI BRAIN WO CONT    Result Date: 8/3/2022  EXAM: MRI BRAIN WO CONT INDICATION: stroke eval COMPARISON: CTA 8/2/2022. CONTRAST: None. TECHNIQUE:  Multiplanar multisequence acquisition without contrast of the brain. FINDINGS: The ventricles are normal in size and position. There is no acute infarct, hemorrhage, extra-axial fluid collection, or mass effect. There is no cerebellar tonsillar herniation. Expected arterial flow-voids are present. The paranasal sinuses, mastoid air cells, and middle ears are clear. The orbital contents are within normal limits.  No significant osseous or scalp lesions are identified. Unremarkable MRI of the brain. CTA CODE NEURO HEAD AND NECK W CONT    Result Date: 8/2/2022  EXAM: CTA CODE NEURO HEAD AND NECK W CONT, CT CODE NEURO PERF W CBF INDICATION: Code Stroke COMPARISON: None. CONTRAST: 100 mL of Isovue-370. TECHNIQUE:  Unenhanced  images were obtained to localize the volume for acquisition. Multislice helical axial CT angiography was performed from the aortic arch to the top of the head during uneventful rapid bolus intravenous contrast administration. Coronal and sagittal reformations and 3D post processing was performed. CT dose reduction was achieved through use of a standardized protocol tailored for this examination and automatic exposure control for dose modulation. This study was analyzed by the [address] ai algorithm. CT brain perfusion was performed with generation of hemodynamic maps of multiple parameters, including cerebral blood flow, cerebral blood volume, and MTT (mean transit time). CT dose reduction was achieved through use of a standardized protocol tailored for this examination and automatic exposure control for dose modulation. FINDINGS: CTA NECK There is conventional three vessel arch anatomy. The bilateral subclavian, common carotid, and internal carotid arteries are patent with no flow-limiting stenosis. % of right carotid artery stenosis: No significant stenosis % of left carotid artery stenosis: No significant stenosis NASCET method was utilized for calculating stenosis. Left vertebral artery is dominant. .  The cervical soft tissues are unremarkable. There are degenerative changes of the cervical spine. CTA HEAD Left vertebral artery is dominant. There is mild stenosis of the left V4 segment. There is severe multifocal stenosis of the right V4 segment. Fetal origin of the left posterior cerebral artery. Mild stenosis of the proximal right P2 segment. There is moderate stenosis of the left P2 segment. . Moderate to severe stenosis of the anterior right M2 segment. . . There is no aneurysm. . CT PERFUSION: There are no regional areas of elevated Tmax, decreased cerebral blood flow or blood volume. rCBF < 30% = 0 cc. Tmax > 6 seconds = 0 cc. 1.  Intracranial atherosclerotic disease with multifocal stenoses as described. 2.  No large vessel occlusion is identified. 3.  Other incidental findings as above. CT CODE NEURO HEAD WO CONTRAST    Result Date: 8/2/2022  EXAM: CT CODE NEURO HEAD WO CONTRAST INDICATION: right arm numbness, ataxia COMPARISON: None. CONTRAST: None. TECHNIQUE: Unenhanced CT of the head was performed using 5 mm images. Brain and bone windows were generated. Coronal and sagittal reformats. CT dose reduction was achieved through use of a standardized protocol tailored for this examination and automatic exposure control for dose modulation. FINDINGS: The ventricles and sulci are normal in size, shape and configuration. . There is no significant white matter disease. There is no intracranial hemorrhage, extra-axial collection, or mass effect. The basilar cisterns are open. No CT evidence of acute infarct. The bone windows demonstrate no abnormalities. The visualized portions of the paranasal sinuses and mastoid air cells are clear. No acute intracranial abnormality. ECHO ADULT COMPLETE    Result Date: 8/3/2022  Formatting of this result is different from the original.   Left Ventricle: Normal left ventricular systolic function with a visually estimated EF of 55 - 60%. Not well visualized. Right Ventricle: Not well visualized. Poor image quality. Most cardiac structures were not visualized. CT CODE NEURO PERF W CBF    Result Date: 8/2/2022  EXAM: CTA CODE NEURO HEAD AND NECK W CONT, CT CODE NEURO PERF W CBF INDICATION: Code Stroke COMPARISON: None. CONTRAST: 100 mL of Isovue-370. TECHNIQUE:  Unenhanced  images were obtained to localize the volume for acquisition.   Multislice helical axial CT angiography was performed from the aortic arch to the top of the head during uneventful rapid bolus intravenous contrast administration. Coronal and sagittal reformations and 3D post processing was performed. CT dose reduction was achieved through use of a standardized protocol tailored for this examination and automatic exposure control for dose modulation. This study was analyzed by the [address] ai algorithm. CT brain perfusion was performed with generation of hemodynamic maps of multiple parameters, including cerebral blood flow, cerebral blood volume, and MTT (mean transit time). CT dose reduction was achieved through use of a standardized protocol tailored for this examination and automatic exposure control for dose modulation. FINDINGS: CTA NECK There is conventional three vessel arch anatomy. The bilateral subclavian, common carotid, and internal carotid arteries are patent with no flow-limiting stenosis. % of right carotid artery stenosis: No significant stenosis % of left carotid artery stenosis: No significant stenosis NASCET method was utilized for calculating stenosis. Left vertebral artery is dominant. .  The cervical soft tissues are unremarkable. There are degenerative changes of the cervical spine. CTA HEAD Left vertebral artery is dominant. There is mild stenosis of the left V4 segment. There is severe multifocal stenosis of the right V4 segment. Fetal origin of the left posterior cerebral artery. Mild stenosis of the proximal right P2 segment. There is moderate stenosis of the left P2 segment. . Moderate to severe stenosis of the anterior right M2 segment. . . There is no aneurysm. . CT PERFUSION: There are no regional areas of elevated Tmax, decreased cerebral blood flow or blood volume. rCBF < 30% = 0 cc. Tmax > 6 seconds = 0 cc. 1.  Intracranial atherosclerotic disease with multifocal stenoses as described. 2.  No large vessel occlusion is identified.  3.  Other incidental findings as above.        NOTIFY YOUR PHYSICIAN FOR ANY OF THE FOLLOWING:   Fever over 101 degrees for 24 hours. Chest pain, shortness of breath, fever, chills, nausea, vomiting, diarrhea, change in mentation, falling, weakness, bleeding. Severe pain or pain not relieved by medications, as well as any other signs or symptoms that you may have questions about. FOLLOW UP APPOINTMENTS:    Follow-up Information       Follow up With Specialties Details Why Contact Info    Zain Louise Physician Assistant Follow up in 1 week(s)  [address] [place] [address], [address], DO Neurology Follow up As needed [address] [place] [address]  [phone]               ADDITIONAL CARE RECOMMENDATIONS:   Check BP everyday and goal is 140-150s, make a record of BP and follow up with PCP. Call PCP if further medication adjustments needed. DIET: Regular Diet      ACTIVITY: Activity as tolerated    WOUND CARE: na    EQUIPMENT needed: na      DISCHARGE MEDICATIONS:  Discharge Medication List as of 8/4/2022  3:12 PM        START taking these medications    Details   amLODIPine (NORVASC) 5 mg tablet Take 1 Tablet by mouth in the morning., Print, Disp-30 Tablet, R-0      aspirin delayed-release 81 mg tablet Take 1 Tablet by mouth in the morning., Print, Disp-30 Tablet, R-0      atorvastatin (LIPITOR) 40 mg tablet Take 1 Tablet by mouth nightly. , Print, Disp-30 Tablet, R-0      clopidogreL (PLAVIX) 75 mg tab Take 1 Tablet by mouth in the morning., Print, Disp-30 Tablet, R-0             DISPOSITION:  x  Home With:   OT  PT  HH  RN       Long term SNF/Inpatient Rehab    Independent/assisted living    Hospice    Other:       PATIENT CONDITION AT DISCHARGE:     Functional status    Poor     Deconditioned    x Independent      Cognition    x Lucid     Forgetful     Dementia      Catheters/lines (plus indication)    [place]     PICC     PEG    x None      Code status    x Full code     DNR      PHYSICAL EXAMINATION AT DISCHARGE:  Constitutional:  No acute distress, cooperative, pleasant    ENT:  Oral mucosa moist, oropharynx benign. Resp:  CTA bilaterally. No wheezing/rhonchi/rales. No accessory muscle use. CV:  Regular rhythm, normal rate, no murmurs, gallops, rubs    GI:  Soft, non distended, non tender. normoactive bowel sounds, no hepatosplenomegaly    Musculoskeletal:  No edema, warm, 2+ pulses throughout    Neurologic:  Moves all extremities.   AAOx3, CN II-XII reviewed         CHRONIC MEDICAL DIAGNOSES:  Problem List as of 8/4/2022 Never Reviewed            Codes Class Noted - Resolved    TIA (transient ischemic attack) ICD-10-CM: G45.9  ICD-9-CM: 435.9  8/2/2022 - Present        Acute right-sided weakness ICD-10-CM: R53.1  ICD-9-CM: 728.87  8/2/2022 - Present        Hypertensive urgency ICD-10-CM: I16.0  ICD-9-CM: 401.9  8/2/2022 - Present        Right sided numbness ICD-10-CM: R20.0  ICD-9-CM: 782.0  8/2/2022 - Present           40 minutes were spent with the patient on counseling and coordination of care    Signed:   Martin Fernandez MD  8/9/2022  8:09 AM    Cc:ADELAIDA Rose PA

## 2022-08-31 ENCOUNTER — TELEPHONE (OUTPATIENT)
Dept: NEUROLOGY | Age: 52
End: 2022-08-31

## 2022-08-31 NOTE — TELEPHONE ENCOUNTER
Henry Pradhan from Dr. Cristina Baeza office is calling asking about patients blood pressure after TIA (140/80). Please call.